# Patient Record
Sex: MALE | Race: WHITE | Employment: FULL TIME | URBAN - METROPOLITAN AREA
[De-identification: names, ages, dates, MRNs, and addresses within clinical notes are randomized per-mention and may not be internally consistent; named-entity substitution may affect disease eponyms.]

---

## 2021-12-10 ENCOUNTER — HOSPITAL ENCOUNTER (EMERGENCY)
Facility: CLINIC | Age: 56
Discharge: HOME | End: 2021-12-10
Attending: EMERGENCY MEDICINE | Admitting: EMERGENCY MEDICINE
Payer: COMMERCIAL

## 2021-12-10 VITALS
SYSTOLIC BLOOD PRESSURE: 133 MMHG | HEART RATE: 86 BPM | DIASTOLIC BLOOD PRESSURE: 96 MMHG | OXYGEN SATURATION: 94 % | TEMPERATURE: 98.5 F | RESPIRATION RATE: 16 BRPM

## 2021-12-10 DIAGNOSIS — R55 SYNCOPE, UNSPECIFIED SYNCOPE TYPE: ICD-10-CM

## 2021-12-10 LAB
ALBUMIN SERPL-MCNC: 3.2 G/DL (ref 3.4–5)
ALP SERPL-CCNC: 68 U/L (ref 40–150)
ALT SERPL W P-5'-P-CCNC: 20 U/L (ref 0–70)
ANION GAP SERPL CALCULATED.3IONS-SCNC: 7 MMOL/L (ref 3–14)
AST SERPL W P-5'-P-CCNC: 22 U/L (ref 0–45)
ATRIAL RATE - MUSE: 78 BPM
BASOPHILS # BLD AUTO: 0 10E3/UL (ref 0–0.2)
BASOPHILS NFR BLD AUTO: 0 %
BILIRUB SERPL-MCNC: 0.3 MG/DL (ref 0.2–1.3)
BUN SERPL-MCNC: 16 MG/DL (ref 7–30)
CALCIUM SERPL-MCNC: 8.7 MG/DL (ref 8.5–10.1)
CHLORIDE BLD-SCNC: 107 MMOL/L (ref 94–109)
CO2 SERPL-SCNC: 24 MMOL/L (ref 20–32)
COHGB MFR BLD: 2.3 % (ref 0–2)
CREAT SERPL-MCNC: 1.43 MG/DL (ref 0.66–1.25)
DIASTOLIC BLOOD PRESSURE - MUSE: NORMAL MMHG
EOSINOPHIL # BLD AUTO: 0 10E3/UL (ref 0–0.7)
EOSINOPHIL NFR BLD AUTO: 0 %
ERYTHROCYTE [DISTWIDTH] IN BLOOD BY AUTOMATED COUNT: 14.5 % (ref 10–15)
FLUAV RNA SPEC QL NAA+PROBE: POSITIVE
FLUBV RNA RESP QL NAA+PROBE: NEGATIVE
GFR SERPL CREATININE-BSD FRML MDRD: 55 ML/MIN/1.73M2
GLUCOSE BLD-MCNC: 97 MG/DL (ref 70–99)
HCT VFR BLD AUTO: 47.8 % (ref 40–53)
HGB BLD-MCNC: 15 G/DL (ref 13.3–17.7)
HOLD SPECIMEN: NORMAL
IMM GRANULOCYTES # BLD: 0.1 10E3/UL
IMM GRANULOCYTES NFR BLD: 1 %
INTERPRETATION ECG - MUSE: NORMAL
LYMPHOCYTES # BLD AUTO: 1.2 10E3/UL (ref 0.8–5.3)
LYMPHOCYTES NFR BLD AUTO: 12 %
MCH RBC QN AUTO: 29.9 PG (ref 26.5–33)
MCHC RBC AUTO-ENTMCNC: 31.4 G/DL (ref 31.5–36.5)
MCV RBC AUTO: 95 FL (ref 78–100)
MONOCYTES # BLD AUTO: 0.9 10E3/UL (ref 0–1.3)
MONOCYTES NFR BLD AUTO: 9 %
NEUTROPHILS # BLD AUTO: 7.8 10E3/UL (ref 1.6–8.3)
NEUTROPHILS NFR BLD AUTO: 78 %
NRBC # BLD AUTO: 0 10E3/UL
NRBC BLD AUTO-RTO: 0 /100
P AXIS - MUSE: 54 DEGREES
PLATELET # BLD AUTO: 301 10E3/UL (ref 150–450)
POTASSIUM BLD-SCNC: 4.4 MMOL/L (ref 3.4–5.3)
PR INTERVAL - MUSE: 170 MS
PROT SERPL-MCNC: 7.8 G/DL (ref 6.8–8.8)
QRS DURATION - MUSE: 90 MS
QT - MUSE: 370 MS
QTC - MUSE: 421 MS
R AXIS - MUSE: 24 DEGREES
RBC # BLD AUTO: 5.02 10E6/UL (ref 4.4–5.9)
SARS-COV-2 RNA RESP QL NAA+PROBE: NEGATIVE
SODIUM SERPL-SCNC: 138 MMOL/L (ref 133–144)
SYSTOLIC BLOOD PRESSURE - MUSE: NORMAL MMHG
T AXIS - MUSE: 44 DEGREES
TROPONIN I SERPL HS-MCNC: 7 NG/L
VENTRICULAR RATE- MUSE: 78 BPM
WBC # BLD AUTO: 10 10E3/UL (ref 4–11)

## 2021-12-10 PROCEDURE — 99284 EMERGENCY DEPT VISIT MOD MDM: CPT | Mod: 25

## 2021-12-10 PROCEDURE — 87636 SARSCOV2 & INF A&B AMP PRB: CPT | Performed by: EMERGENCY MEDICINE

## 2021-12-10 PROCEDURE — 258N000003 HC RX IP 258 OP 636: Performed by: EMERGENCY MEDICINE

## 2021-12-10 PROCEDURE — 82040 ASSAY OF SERUM ALBUMIN: CPT | Performed by: EMERGENCY MEDICINE

## 2021-12-10 PROCEDURE — 93005 ELECTROCARDIOGRAM TRACING: CPT

## 2021-12-10 PROCEDURE — 96361 HYDRATE IV INFUSION ADD-ON: CPT

## 2021-12-10 PROCEDURE — 36415 COLL VENOUS BLD VENIPUNCTURE: CPT | Performed by: EMERGENCY MEDICINE

## 2021-12-10 PROCEDURE — 96360 HYDRATION IV INFUSION INIT: CPT

## 2021-12-10 PROCEDURE — 85025 COMPLETE CBC W/AUTO DIFF WBC: CPT | Performed by: EMERGENCY MEDICINE

## 2021-12-10 PROCEDURE — 84484 ASSAY OF TROPONIN QUANT: CPT | Performed by: EMERGENCY MEDICINE

## 2021-12-10 PROCEDURE — C9803 HOPD COVID-19 SPEC COLLECT: HCPCS

## 2021-12-10 PROCEDURE — 82375 ASSAY CARBOXYHB QUANT: CPT | Performed by: EMERGENCY MEDICINE

## 2021-12-10 RX ADMIN — SODIUM CHLORIDE 1000 ML: 9 INJECTION, SOLUTION INTRAVENOUS at 10:49

## 2021-12-10 ASSESSMENT — ENCOUNTER SYMPTOMS
DYSURIA: 0
ABDOMINAL PAIN: 0
DIARRHEA: 1
FEVER: 1
COUGH: 1
MYALGIAS: 0
DIAPHORESIS: 1

## 2021-12-10 NOTE — ED PROVIDER NOTES
History   Chief Complaint:  Fever     The history is provided by the patient.      Kj Nelson is a 56 year old male with history of renal insufficiency, EMILIE, and ulcerative colitis who presents via ambulance with a fever. Starting 4 days ago the patient began experiencing a dry cough and fever so was seen in the Urgent Care on 12/08. At this time he tested negative for covid and had a chest x-ray performed. He was then discharged with tessalon and robitussin which he has been taking since his appointment. Over the last 2 days he has continued to have an intermittent fever which has been resolved with Tylenol. This morning his fever returned again and he took 2 Tylenol, then 30 minutes later he took his cough medication. Shortly after taking the medications he began to feel diaphoretic and had diarrhea. The patient then had an episode of syncope witnessed by his wife. Upon arrival to the ED he denies any chest pain, abdominal pain, leg pain, leg swelling, or dysuria. The patient is not covid vaccinated and has no known sick exposures.     12/08-  Work Up    Imaging:  XR Chest 2 Views PA and Lateral:  No consolidation. Increased interstitial lung markings. Small calcified granuloma in the right lower lobe. Calcified right hilar and right low paratracheal lymph nodes. No large effusions or pneumothorax. Normal cardiac size.  Per radiology    Review of Systems   Constitutional: Positive for diaphoresis and fever.   Respiratory: Positive for cough.    Cardiovascular: Negative for chest pain and leg swelling.   Gastrointestinal: Positive for diarrhea. Negative for abdominal pain.   Genitourinary: Negative for dysuria.   Musculoskeletal: Negative for myalgias (leg).   Neurological: Positive for syncope.   All other systems reviewed and are negative.      Allergies:  Sulfabenzamide    Medications:  citalopram   levothyroxine   benzonatate   codeine-guaifenesin     Past Medical History:     Renal  insufficiency  Vitamin D deficiency  EMILIE (generalized anxiety disorder)  Ulcerative colitis    Past Surgical History:    Colonoscopy diagnostic     Family History:    Breast cancer     Social History:  Presents unaccompanied.   PCP: No primary care provider on file.     Physical Exam     Patient Vitals for the past 24 hrs:   BP Temp Temp src Pulse Resp SpO2   12/10/21 1145 (!) 135/97 -- -- 79 -- 95 %   12/10/21 1140 (!) 135/95 -- -- 83 -- 94 %   12/10/21 1024 -- 98.5  F (36.9  C) Oral -- -- --   12/10/21 1020 127/88 -- -- 96 16 100 %       Physical Exam  Constitutional: Well appearing.  HEENT: Atraumatic.  PERRL.  EOMI.  Moist mucous membranes.  Neck: Soft.  Supple.  No JVD.  Cardiac: Regular rate and rhythm.  No murmur or rub.  Respiratory: Clear to auscultation bilaterally.  No respiratory distress.   Abdomen: Soft and nontender.  No rebound or guarding.  Nondistended.  Musculoskeletal: No edema.  Normal range of motion.  Neurologic: Alert and oriented x3.  Normal tone and bulk. No facial drooping. Normal speech. 5/5 strength in bilateral upper and lower extremities.    Skin: No rashes.  No edema.  Psych: Normal affect.  Normal behavior.    Emergency Department Course   ECG  ECG obtained at 1100, ECG read at 1106  Normal sinus rhythm. Normal ECG.   No significant change as compared to prior, dated 08/17/2007.  Rate 78 bpm. NC interval 170 ms. QRS duration 90 ms. QT/QTc 370/421 ms. P-R-T axes 54 24 44.     Laboratory:  Labs Ordered and Resulted from Time of ED Arrival to Time of ED Departure   COMPREHENSIVE METABOLIC PANEL - Abnormal       Result Value    Sodium 138      Potassium 4.4      Chloride 107      Carbon Dioxide (CO2) 24      Anion Gap 7      Urea Nitrogen 16      Creatinine 1.43 (*)     Calcium 8.7      Glucose 97      Alkaline Phosphatase 68      AST 22      ALT 20      Protein Total 7.8      Albumin 3.2 (*)     Bilirubin Total 0.3      GFR Estimate 55 (*)    CBC WITH PLATELETS AND DIFFERENTIAL -  Abnormal    WBC Count 10.0      RBC Count 5.02      Hemoglobin 15.0      Hematocrit 47.8      MCV 95      MCH 29.9      MCHC 31.4 (*)     RDW 14.5      Platelet Count 301      % Neutrophils 78      % Lymphocytes 12      % Monocytes 9      % Eosinophils 0      % Basophils 0      % Immature Granulocytes 1      NRBCs per 100 WBC 0      Absolute Neutrophils 7.8      Absolute Lymphocytes 1.2      Absolute Monocytes 0.9      Absolute Eosinophils 0.0      Absolute Basophils 0.0      Absolute Immature Granulocytes 0.1      Absolute NRBCs 0.0     TROPONIN I - Normal    Troponin I High Sensitivity 7     CARBON MONOXIDE   INFLUENZA A/B & SARS-COV2 PCR MULTIPLEX       Emergency Department Course:    Reviewed:  I reviewed nursing notes, vitals, past medical history and Care Everywhere    Assessments:  1018 I obtained history and examined the patient as noted above.    I rechecked the patient and explained findings.     Interventions:  1049 0.9% sodium chloride bolus, 1,000 mL, IV     Disposition:  The patient was discharged to home.     Impression & Plan     Medical Decision Making:  Data name is a 56-year-old man who is afebrile and hemodynamically stable. He is neurologically intact. EKG demonstrates a sinus rhythm with no acute ischemic changes on my read. His lab work-up is noted as above and grossly reassuring. He has been having a fever and some GI symptoms and a cough for a few days. A Covid test was negative from the other day. His cough actually improved and is no longer ongoing. He is not have a fever here. He has no leukocytosis. He appears well and has normal vital signs at this time. We discussed potential vasovagal syncope. His wife is present and states that he has a significant moderate anxiety and the patient himself believes that he likely had a panic attack leading to a syncopal episodes. At this time, I see no high risk factors that would necessitate admission to the hospital for syncopal episode. He is wife  had similar episodes of the same time, however, his wife is also a patient that I was taking care of I think was likely vasovagal in nature due to general GI illness and potential witnessing what Kj was going through. They both deny any drug use and never not use any substances containing organophosphates or any other substance that would demonstrate a toxidrome. His carbon monoxide level is 2.3 just minimally elevated. Fire department was at the house and did not find any evidence of carbon monoxide in the patient's wife had a normal carbon monoxide level. He feels much better and feels countable discharging home. Discussed supportive care at home and the need to follow-up closely with a primary care physician. He is in agreement with this plan and his questions were answered. He was in no distress at time of discharge.      Diagnosis:    ICD-10-CM    1. Syncope, unspecified syncope type  R55        Discharge Medications:  New Prescriptions    No medications on file       Scribe Disclosure:  I, Samantha Diggs, am serving as a scribe at 10:09 AM on 12/10/2021 to document services personally performed by Reynold May MD based on my observations and the provider's statements to me.            Reynold May MD  12/10/21 1510       Reynold May MD  12/10/21 1517

## 2021-12-10 NOTE — ED TRIAGE NOTES
Pt presents to ED with fever for a few days. Pt had diarrhea today. PT and his wife were both feeling sick so they called EMS. Pt appeared pale for EMS upon arival. EMS states that pt pulse was in the 30's when they arrived on scene and improved in to the 70's. Pt had a negative covid test yesterday. ABC intact. Pt alert, acting appropriately upon arrival.

## 2025-07-20 ENCOUNTER — APPOINTMENT (OUTPATIENT)
Dept: CT IMAGING | Facility: CLINIC | Age: 60
End: 2025-07-20
Attending: EMERGENCY MEDICINE
Payer: COMMERCIAL

## 2025-07-20 ENCOUNTER — HOSPITAL ENCOUNTER (INPATIENT)
Facility: CLINIC | Age: 60
LOS: 2 days | Discharge: HOME | End: 2025-07-22
Attending: EMERGENCY MEDICINE | Admitting: INTERNAL MEDICINE
Payer: COMMERCIAL

## 2025-07-20 DIAGNOSIS — L08.9 NECK INFECTION: Primary | ICD-10-CM

## 2025-07-20 LAB
ALBUMIN UR-MCNC: NEGATIVE MG/DL
ANION GAP SERPL CALCULATED.3IONS-SCNC: 14 MMOL/L (ref 7–15)
ANION GAP SERPL CALCULATED.3IONS-SCNC: 14 MMOL/L (ref 7–15)
APPEARANCE UR: CLEAR
BASOPHILS # BLD AUTO: 0 10E3/UL (ref 0–0.2)
BASOPHILS NFR BLD AUTO: 0 %
BILIRUB UR QL STRIP: NEGATIVE
BUN SERPL-MCNC: 16.3 MG/DL (ref 6–20)
BUN SERPL-MCNC: 18.6 MG/DL (ref 6–20)
CALCIUM SERPL-MCNC: 8.8 MG/DL (ref 8.8–10.4)
CALCIUM SERPL-MCNC: 9 MG/DL (ref 8.8–10.4)
CHLORIDE SERPL-SCNC: 100 MMOL/L (ref 98–107)
CHLORIDE SERPL-SCNC: 103 MMOL/L (ref 98–107)
COLOR UR AUTO: ABNORMAL
CREAT SERPL-MCNC: 1.68 MG/DL (ref 0.67–1.17)
CREAT SERPL-MCNC: 1.85 MG/DL (ref 0.67–1.17)
CREAT UR-MCNC: 68.3 MG/DL
CRP SERPL-MCNC: 83.32 MG/L
EGFRCR SERPLBLD CKD-EPI 2021: 42 ML/MIN/1.73M2
EGFRCR SERPLBLD CKD-EPI 2021: 47 ML/MIN/1.73M2
EOSINOPHIL # BLD AUTO: 0.6 10E3/UL (ref 0–0.7)
EOSINOPHIL NFR BLD AUTO: 4 %
ERYTHROCYTE [DISTWIDTH] IN BLOOD BY AUTOMATED COUNT: 14.2 % (ref 10–15)
ERYTHROCYTE [DISTWIDTH] IN BLOOD BY AUTOMATED COUNT: 14.3 % (ref 10–15)
ERYTHROCYTE [SEDIMENTATION RATE] IN BLOOD BY WESTERGREN METHOD: 1 MM/HR (ref 0–20)
FLUAV RNA SPEC QL NAA+PROBE: NEGATIVE
FLUBV RNA RESP QL NAA+PROBE: NEGATIVE
FRACT EXCRET NA UR+SERPL-RTO: 0.9 %
GLUCOSE SERPL-MCNC: 109 MG/DL (ref 70–99)
GLUCOSE SERPL-MCNC: 119 MG/DL (ref 70–99)
GLUCOSE UR STRIP-MCNC: NEGATIVE MG/DL
HCO3 BLDV-SCNC: 24 MMOL/L (ref 21–28)
HCO3 SERPL-SCNC: 20 MMOL/L (ref 22–29)
HCO3 SERPL-SCNC: 21 MMOL/L (ref 22–29)
HCT VFR BLD AUTO: 45.8 % (ref 40–53)
HCT VFR BLD AUTO: 48 % (ref 40–53)
HGB BLD-MCNC: 15.4 G/DL (ref 13.3–17.7)
HGB BLD-MCNC: 16.1 G/DL (ref 13.3–17.7)
HGB UR QL STRIP: ABNORMAL
IMM GRANULOCYTES # BLD: 0.1 10E3/UL
IMM GRANULOCYTES NFR BLD: 1 %
INR PPP: 1.04 (ref 0.85–1.15)
KETONES UR STRIP-MCNC: 10 MG/DL
LACTATE BLD-SCNC: 0.9 MMOL/L (ref 0.7–2)
LEUKOCYTE ESTERASE UR QL STRIP: NEGATIVE
LYMPHOCYTES # BLD AUTO: 2.3 10E3/UL (ref 0.8–5.3)
LYMPHOCYTES NFR BLD AUTO: 19 %
MCH RBC QN AUTO: 30.3 PG (ref 26.5–33)
MCH RBC QN AUTO: 30.3 PG (ref 26.5–33)
MCHC RBC AUTO-ENTMCNC: 33.5 G/DL (ref 31.5–36.5)
MCHC RBC AUTO-ENTMCNC: 33.6 G/DL (ref 31.5–36.5)
MCV RBC AUTO: 90 FL (ref 78–100)
MCV RBC AUTO: 90 FL (ref 78–100)
MONOCYTES # BLD AUTO: 1.7 10E3/UL (ref 0–1.3)
MONOCYTES NFR BLD AUTO: 14 %
MONOCYTES NFR BLD AUTO: NEGATIVE %
MRSA DNA SPEC QL NAA+PROBE: NEGATIVE
MUCOUS THREADS #/AREA URNS LPF: PRESENT /LPF
NEUTROPHILS # BLD AUTO: 7.7 10E3/UL (ref 1.6–8.3)
NEUTROPHILS NFR BLD AUTO: 62 %
NITRATE UR QL: NEGATIVE
NRBC # BLD AUTO: 0 10E3/UL
NRBC BLD AUTO-RTO: 0 /100
PCO2 BLDV: 39 MM HG (ref 40–50)
PH BLDV: 7.4 [PH] (ref 7.32–7.43)
PH UR STRIP: 5 [PH] (ref 5–7)
PLATELET # BLD AUTO: 364 10E3/UL (ref 150–450)
PLATELET # BLD AUTO: 370 10E3/UL (ref 150–450)
PO2 BLDV: 22 MM HG (ref 25–47)
POTASSIUM SERPL-SCNC: 4.1 MMOL/L (ref 3.4–5.3)
POTASSIUM SERPL-SCNC: 4.3 MMOL/L (ref 3.4–5.3)
PROTHROMBIN TIME: 13.7 SECONDS (ref 11.8–14.8)
RBC # BLD AUTO: 5.09 10E6/UL (ref 4.4–5.9)
RBC # BLD AUTO: 5.32 10E6/UL (ref 4.4–5.9)
RBC URINE: 1 /HPF
RSV RNA SPEC NAA+PROBE: NEGATIVE
S PYO DNA THROAT QL NAA+PROBE: NOT DETECTED
SA TARGET DNA: NEGATIVE
SAO2 % BLDV: 36 % (ref 70–75)
SARS-COV-2 RNA RESP QL NAA+PROBE: NEGATIVE
SODIUM SERPL-SCNC: 135 MMOL/L (ref 135–145)
SODIUM SERPL-SCNC: 137 MMOL/L (ref 135–145)
SODIUM UR-SCNC: 50 MMOL/L
SP GR UR STRIP: 1.03 (ref 1–1.03)
UROBILINOGEN UR STRIP-MCNC: NORMAL MG/DL
WBC # BLD AUTO: 11.7 10E3/UL (ref 4–11)
WBC # BLD AUTO: 12.4 10E3/UL (ref 4–11)
WBC URINE: 1 /HPF

## 2025-07-20 PROCEDURE — 82374 ASSAY BLOOD CARBON DIOXIDE: CPT | Performed by: INTERNAL MEDICINE

## 2025-07-20 PROCEDURE — 87637 SARSCOV2&INF A&B&RSV AMP PRB: CPT | Performed by: EMERGENCY MEDICINE

## 2025-07-20 PROCEDURE — 250N000011 HC RX IP 250 OP 636: Performed by: EMERGENCY MEDICINE

## 2025-07-20 PROCEDURE — 99207 PR APP CREDIT; MD BILLING SHARED VISIT: CPT | Performed by: INTERNAL MEDICINE

## 2025-07-20 PROCEDURE — 96365 THER/PROPH/DIAG IV INF INIT: CPT | Mod: 59

## 2025-07-20 PROCEDURE — 250N000009 HC RX 250: Performed by: EMERGENCY MEDICINE

## 2025-07-20 PROCEDURE — 96366 THER/PROPH/DIAG IV INF ADDON: CPT

## 2025-07-20 PROCEDURE — 36415 COLL VENOUS BLD VENIPUNCTURE: CPT | Performed by: INTERNAL MEDICINE

## 2025-07-20 PROCEDURE — 99285 EMERGENCY DEPT VISIT HI MDM: CPT | Mod: 25 | Performed by: EMERGENCY MEDICINE

## 2025-07-20 PROCEDURE — 85018 HEMOGLOBIN: CPT | Performed by: INTERNAL MEDICINE

## 2025-07-20 PROCEDURE — 86140 C-REACTIVE PROTEIN: CPT | Performed by: EMERGENCY MEDICINE

## 2025-07-20 PROCEDURE — 70491 CT SOFT TISSUE NECK W/DYE: CPT

## 2025-07-20 PROCEDURE — 99223 1ST HOSP IP/OBS HIGH 75: CPT | Mod: AI | Performed by: INTERNAL MEDICINE

## 2025-07-20 PROCEDURE — 258N000003 HC RX IP 258 OP 636: Performed by: EMERGENCY MEDICINE

## 2025-07-20 PROCEDURE — 87040 BLOOD CULTURE FOR BACTERIA: CPT | Performed by: EMERGENCY MEDICINE

## 2025-07-20 PROCEDURE — 85025 COMPLETE CBC W/AUTO DIFF WBC: CPT | Performed by: EMERGENCY MEDICINE

## 2025-07-20 PROCEDURE — 87651 STREP A DNA AMP PROBE: CPT | Performed by: EMERGENCY MEDICINE

## 2025-07-20 PROCEDURE — 250N000011 HC RX IP 250 OP 636: Performed by: INTERNAL MEDICINE

## 2025-07-20 PROCEDURE — 36415 COLL VENOUS BLD VENIPUNCTURE: CPT | Performed by: EMERGENCY MEDICINE

## 2025-07-20 PROCEDURE — 81003 URINALYSIS AUTO W/O SCOPE: CPT | Performed by: INTERNAL MEDICINE

## 2025-07-20 PROCEDURE — 258N000003 HC RX IP 258 OP 636: Performed by: INTERNAL MEDICINE

## 2025-07-20 PROCEDURE — 82803 BLOOD GASES ANY COMBINATION: CPT

## 2025-07-20 PROCEDURE — 82570 ASSAY OF URINE CREATININE: CPT | Performed by: INTERNAL MEDICINE

## 2025-07-20 PROCEDURE — 86308 HETEROPHILE ANTIBODY SCREEN: CPT | Performed by: EMERGENCY MEDICINE

## 2025-07-20 PROCEDURE — 99418 PROLNG IP/OBS E/M EA 15 MIN: CPT | Performed by: INTERNAL MEDICINE

## 2025-07-20 PROCEDURE — 85652 RBC SED RATE AUTOMATED: CPT | Performed by: EMERGENCY MEDICINE

## 2025-07-20 PROCEDURE — 80048 BASIC METABOLIC PNL TOTAL CA: CPT | Performed by: EMERGENCY MEDICINE

## 2025-07-20 PROCEDURE — 85610 PROTHROMBIN TIME: CPT | Performed by: INTERNAL MEDICINE

## 2025-07-20 PROCEDURE — 87641 MR-STAPH DNA AMP PROBE: CPT | Performed by: INTERNAL MEDICINE

## 2025-07-20 PROCEDURE — 120N000001 HC R&B MED SURG/OB

## 2025-07-20 PROCEDURE — 250N000013 HC RX MED GY IP 250 OP 250 PS 637: Performed by: EMERGENCY MEDICINE

## 2025-07-20 RX ORDER — IOPAMIDOL 755 MG/ML
500 INJECTION, SOLUTION INTRAVASCULAR ONCE
Status: COMPLETED | OUTPATIENT
Start: 2025-07-20 | End: 2025-07-20

## 2025-07-20 RX ORDER — ENOXAPARIN SODIUM 100 MG/ML
40 INJECTION SUBCUTANEOUS EVERY 24 HOURS
Status: DISCONTINUED | OUTPATIENT
Start: 2025-07-20 | End: 2025-07-22 | Stop reason: HOSPADM

## 2025-07-20 RX ORDER — CYCLOBENZAPRINE HCL 10 MG
5 TABLET ORAL 3 TIMES DAILY PRN
COMMUNITY

## 2025-07-20 RX ORDER — ACETAMINOPHEN 325 MG/1
975 TABLET ORAL ONCE
Status: COMPLETED | OUTPATIENT
Start: 2025-07-20 | End: 2025-07-20

## 2025-07-20 RX ORDER — LEVOTHYROXINE SODIUM 75 UG/1
75 TABLET ORAL DAILY
COMMUNITY

## 2025-07-20 RX ORDER — ONDANSETRON 2 MG/ML
4 INJECTION INTRAMUSCULAR; INTRAVENOUS EVERY 6 HOURS PRN
Status: DISCONTINUED | OUTPATIENT
Start: 2025-07-20 | End: 2025-07-22 | Stop reason: HOSPADM

## 2025-07-20 RX ORDER — AMPICILLIN AND SULBACTAM 2; 1 G/1; G/1
3 INJECTION, POWDER, FOR SOLUTION INTRAMUSCULAR; INTRAVENOUS ONCE
Status: COMPLETED | OUTPATIENT
Start: 2025-07-20 | End: 2025-07-20

## 2025-07-20 RX ORDER — LORAZEPAM 0.5 MG/1
0.5 TABLET ORAL DAILY PRN
COMMUNITY

## 2025-07-20 RX ORDER — AMPICILLIN AND SULBACTAM 2; 1 G/1; G/1
3 INJECTION, POWDER, FOR SOLUTION INTRAMUSCULAR; INTRAVENOUS EVERY 6 HOURS
Status: DISCONTINUED | OUTPATIENT
Start: 2025-07-20 | End: 2025-07-22 | Stop reason: HOSPADM

## 2025-07-20 RX ORDER — LIDOCAINE 40 MG/G
CREAM TOPICAL
Status: DISCONTINUED | OUTPATIENT
Start: 2025-07-20 | End: 2025-07-22 | Stop reason: HOSPADM

## 2025-07-20 RX ORDER — ACETAMINOPHEN 325 MG/10.15ML
650 LIQUID ORAL EVERY 4 HOURS PRN
Status: DISCONTINUED | OUTPATIENT
Start: 2025-07-20 | End: 2025-07-22 | Stop reason: HOSPADM

## 2025-07-20 RX ORDER — PROCHLORPERAZINE MALEATE 5 MG/1
10 TABLET ORAL EVERY 6 HOURS PRN
Status: DISCONTINUED | OUTPATIENT
Start: 2025-07-20 | End: 2025-07-22 | Stop reason: HOSPADM

## 2025-07-20 RX ORDER — CITALOPRAM HYDROBROMIDE 20 MG/1
20 TABLET ORAL DAILY
COMMUNITY

## 2025-07-20 RX ORDER — ONDANSETRON 4 MG/1
4 TABLET, ORALLY DISINTEGRATING ORAL EVERY 6 HOURS PRN
Status: DISCONTINUED | OUTPATIENT
Start: 2025-07-20 | End: 2025-07-22 | Stop reason: HOSPADM

## 2025-07-20 RX ORDER — CALCIUM CARBONATE 500 MG/1
1000 TABLET, CHEWABLE ORAL 4 TIMES DAILY PRN
Status: DISCONTINUED | OUTPATIENT
Start: 2025-07-20 | End: 2025-07-22 | Stop reason: HOSPADM

## 2025-07-20 RX ORDER — AMOXICILLIN 250 MG
1 CAPSULE ORAL 2 TIMES DAILY PRN
Status: DISCONTINUED | OUTPATIENT
Start: 2025-07-20 | End: 2025-07-22 | Stop reason: HOSPADM

## 2025-07-20 RX ORDER — SODIUM CHLORIDE 9 MG/ML
INJECTION, SOLUTION INTRAVENOUS CONTINUOUS
Status: DISCONTINUED | OUTPATIENT
Start: 2025-07-20 | End: 2025-07-20

## 2025-07-20 RX ORDER — AMOXICILLIN 250 MG
2 CAPSULE ORAL 2 TIMES DAILY PRN
Status: DISCONTINUED | OUTPATIENT
Start: 2025-07-20 | End: 2025-07-22 | Stop reason: HOSPADM

## 2025-07-20 RX ADMIN — AMPICILLIN SODIUM AND SULBACTAM SODIUM 3 G: 2; 1 INJECTION, POWDER, FOR SOLUTION INTRAMUSCULAR; INTRAVENOUS at 03:40

## 2025-07-20 RX ADMIN — ENOXAPARIN SODIUM 40 MG: 40 INJECTION SUBCUTANEOUS at 20:57

## 2025-07-20 RX ADMIN — IOPAMIDOL 90 ML: 755 INJECTION, SOLUTION INTRAVENOUS at 02:12

## 2025-07-20 RX ADMIN — AMPICILLIN SODIUM AND SULBACTAM SODIUM 3 G: 2; 1 INJECTION, POWDER, FOR SOLUTION INTRAMUSCULAR; INTRAVENOUS at 15:29

## 2025-07-20 RX ADMIN — SODIUM CHLORIDE 65 ML: 9 INJECTION, SOLUTION INTRAVENOUS at 02:12

## 2025-07-20 RX ADMIN — AMPICILLIN SODIUM AND SULBACTAM SODIUM 3 G: 2; 1 INJECTION, POWDER, FOR SOLUTION INTRAMUSCULAR; INTRAVENOUS at 09:33

## 2025-07-20 RX ADMIN — ACETAMINOPHEN 975 MG: 325 TABLET ORAL at 01:36

## 2025-07-20 RX ADMIN — SODIUM CHLORIDE 1000 ML: 9 INJECTION, SOLUTION INTRAVENOUS at 01:35

## 2025-07-20 RX ADMIN — AMPICILLIN SODIUM AND SULBACTAM SODIUM 3 G: 2; 1 INJECTION, POWDER, FOR SOLUTION INTRAMUSCULAR; INTRAVENOUS at 21:00

## 2025-07-20 RX ADMIN — SODIUM CHLORIDE: 0.9 INJECTION, SOLUTION INTRAVENOUS at 06:12

## 2025-07-20 ASSESSMENT — ACTIVITIES OF DAILY LIVING (ADL)
WALKING_OR_CLIMBING_STAIRS_DIFFICULTY: NO
ADLS_ACUITY_SCORE: 20
WEAR_GLASSES_OR_BLIND: NO
DOING_ERRANDS_INDEPENDENTLY_DIFFICULTY: NO
ADLS_ACUITY_SCORE: 20
ADLS_ACUITY_SCORE: 20
ADLS_ACUITY_SCORE: 22
ADLS_ACUITY_SCORE: 20
ADLS_ACUITY_SCORE: 20
ADLS_ACUITY_SCORE: 22
ADLS_ACUITY_SCORE: 20
ADLS_ACUITY_SCORE: 20
DIFFICULTY_EATING/SWALLOWING: NO
TOILETING_ISSUES: NO
ADLS_ACUITY_SCORE: 22
DIFFICULTY_COMMUNICATING: NO
ADLS_ACUITY_SCORE: 41
ADLS_ACUITY_SCORE: 22
ADLS_ACUITY_SCORE: 20
CONCENTRATING,_REMEMBERING_OR_MAKING_DECISIONS_DIFFICULTY: NO
ADLS_ACUITY_SCORE: 20
ADLS_ACUITY_SCORE: 22
ADLS_ACUITY_SCORE: 22
DRESSING/BATHING_DIFFICULTY: NO
ADLS_ACUITY_SCORE: 41
ADLS_ACUITY_SCORE: 41
ADLS_ACUITY_SCORE: 20
ADLS_ACUITY_SCORE: 41
FALL_HISTORY_WITHIN_LAST_SIX_MONTHS: NO
HEARING_DIFFICULTY_OR_DEAF: NO
ADLS_ACUITY_SCORE: 20
ADLS_ACUITY_SCORE: 41
ADLS_ACUITY_SCORE: 22
CHANGE_IN_FUNCTIONAL_STATUS_SINCE_ONSET_OF_CURRENT_ILLNESS/INJURY: NO

## 2025-07-20 ASSESSMENT — COLUMBIA-SUICIDE SEVERITY RATING SCALE - C-SSRS
2. HAVE YOU ACTUALLY HAD ANY THOUGHTS OF KILLING YOURSELF IN THE PAST MONTH?: NO
6. HAVE YOU EVER DONE ANYTHING, STARTED TO DO ANYTHING, OR PREPARED TO DO ANYTHING TO END YOUR LIFE?: NO
1. IN THE PAST MONTH, HAVE YOU WISHED YOU WERE DEAD OR WISHED YOU COULD GO TO SLEEP AND NOT WAKE UP?: NO

## 2025-07-20 NOTE — PLAN OF CARE
"Goal Outcome Evaluation:      Plan of Care Reviewed With: patient    Overall Patient Progress: improvingOverall Patient Progress: improving    Outcome Evaluation: A&O. VSS on RA. NS @ 100mL/hr. Independent in room, wife and family at bedside. Regular diet. Pt on IV unasyn. Voiding well, walking to bathroom. Denied nausea. Denied pain. ENT consulted, cleared from their perspective. Very anxious. Discharge plan TBD, potentially tomorrow.     Problem: Adult Inpatient Plan of Care  Goal: Plan of Care Review  Description: The Plan of Care Review/Shift note should be completed every shift.  The Outcome Evaluation is a brief statement about your assessment that the patient is improving, declining, or no change.  This information will be displayed automatically on your shift  note.  Outcome: Progressing  Flowsheets (Taken 7/20/2025 0954)  Outcome Evaluation: A&O. VSS on RA. NS @ 100mL/hr. Independent in room, wife and family at bedside. NPO maintained. Pt on IV unasyn. Voiding well, walking to bathroom. Denied nausea. Denied pain. ENT consuted. Discharge plan TBD.  Plan of Care Reviewed With: patient  Overall Patient Progress: improving  Goal: Patient-Specific Goal (Individualized)  Description: You can add care plan individualizations to a care plan. Examples of Individualization might be:  \"Parent requests to be called daily at 9am for status\", \"I have a hard time hearing out of my right ear\", or \"Do not touch me to wake me up as it startles  me\".  Outcome: Progressing  Goal: Absence of Hospital-Acquired Illness or Injury  Outcome: Progressing  Intervention: Identify and Manage Fall Risk  Recent Flowsheet Documentation  Taken 7/20/2025 0908 by Huma Salazar RN  Safety Promotion/Fall Prevention:   clutter free environment maintained   lighting adjusted   mobility aid in reach   nonskid shoes/slippers when out of bed   patient and family education   room near nurse's station   room organization consistent   safety " round/check completed  Intervention: Prevent and Manage VTE (Venous Thromboembolism) Risk  Recent Flowsheet Documentation  Taken 7/20/2025 0936 by Huma Salazar RN  VTE Prevention/Management: SCDs on (sequential compression devices)  Intervention: Prevent Infection  Recent Flowsheet Documentation  Taken 7/20/2025 0936 by Huma Salazar RN  Infection Prevention:   cohorting utilized   equipment surfaces disinfected   hand hygiene promoted   rest/sleep promoted   single patient room provided  Goal: Optimal Comfort and Wellbeing  Outcome: Progressing  Goal: Readiness for Transition of Care  Outcome: Progressing     Problem: Infection  Goal: Absence of Infection Signs and Symptoms  Outcome: Progressing  Intervention: Prevent or Manage Infection  Recent Flowsheet Documentation  Taken 7/20/2025 0936 by Huma Salazar RN  Isolation Precautions: special precautions discontinued     Problem: Pain Acute  Goal: Optimal Pain Control and Function  Outcome: Progressing  Intervention: Prevent or Manage Pain  Recent Flowsheet Documentation  Taken 7/20/2025 0936 by Huma Salazar RN  Medication Review/Management: medications reviewed

## 2025-07-20 NOTE — PLAN OF CARE
"Goal Outcome Evaluation:      Plan of Care Reviewed With: patient, spouse    Overall Patient Progress: improvingOverall Patient Progress: improving     A&Ox4. Wife at bedside most of the evening. VSS on room air. Denies pain this shift. Swelling to neck improving. Denies nausea or SOB. IV SL.  IV unasyn for abx. Tolerating regular diet. Up independently in room. Showered. Voiding well. Discharge plan is TBD.    Problem: Adult Inpatient Plan of Care  Goal: Plan of Care Review  Description: The Plan of Care Review/Shift note should be completed every shift.  The Outcome Evaluation is a brief statement about your assessment that the patient is improving, declining, or no change.  This information will be displayed automatically on your shift  note.  Outcome: Progressing  Flowsheets (Taken 7/20/2025 1856)  Plan of Care Reviewed With:   patient   spouse  Overall Patient Progress: improving  Goal: Patient-Specific Goal (Individualized)  Description: You can add care plan individualizations to a care plan. Examples of Individualization might be:  \"Parent requests to be called daily at 9am for status\", \"I have a hard time hearing out of my right ear\", or \"Do not touch me to wake me up as it startles  me\".  Outcome: Progressing  Goal: Absence of Hospital-Acquired Illness or Injury  Outcome: Progressing  Intervention: Identify and Manage Fall Risk  Recent Flowsheet Documentation  Taken 7/20/2025 1533 by Rufina Brewer RN  Safety Promotion/Fall Prevention: safety round/check completed  Intervention: Prevent Skin Injury  Recent Flowsheet Documentation  Taken 7/20/2025 1533 by Rufina Brewer RN  Body Position: position changed independently  Skin Protection: adhesive use limited  Intervention: Prevent and Manage VTE (Venous Thromboembolism) Risk  Recent Flowsheet Documentation  Taken 7/20/2025 1533 by Rufina Brewer RN  VTE Prevention/Management: (ambulating in room) SCDs off (sequential compression devices)  Intervention: " Prevent Infection  Recent Flowsheet Documentation  Taken 7/20/2025 1533 by Rufina Brewer, RN  Infection Prevention:   hand hygiene promoted   rest/sleep promoted   single patient room provided  Goal: Optimal Comfort and Wellbeing  Outcome: Progressing  Goal: Readiness for Transition of Care  Outcome: Progressing     Problem: Infection  Goal: Absence of Infection Signs and Symptoms  Outcome: Progressing     Problem: Pain Acute  Goal: Optimal Pain Control and Function  Outcome: Progressing  Intervention: Prevent or Manage Pain  Recent Flowsheet Documentation  Taken 7/20/2025 1533 by Rufina Brewer, RN  Bowel Elimination Promotion:   adequate fluid intake promoted   ambulation promoted  Medication Review/Management: medications reviewed

## 2025-07-20 NOTE — ED TRIAGE NOTES
Here with wife for pain in left neck which started on Thursday after turning head sharply. Was seen at  at 1000 Saturday for pain and given toradol and flexeril, states toradol helped pain but has since worn off. Tylenol approx 1 hour PTA, flexeril 2130. States now neck is warm to touch, swollen and running low grade fever. Endorses sore throat which also started Thursday.

## 2025-07-20 NOTE — PROGRESS NOTES
[facility]    Medicine Progress Note - Hospitalist Service    Date of Admission:  7/20/2025    Assessment & Plan      Kj Nelson is a 59 year old male admitted on 7/20/2025. He has PMH notable for anxiety, hypothyroidism that presents with left-sided neck pain and has findings concerning for unilateral deep space infection of the neck.  At the time of admission his findings are unilateral but he is at high risk to progress to Brennan's angina.        #Left-sided deep space neck infection     - Patient still needing inpatient care as he remained at risk for clinical deterioration will be benefited for close monitoring care  -Remain on broad-spectrum antibiotics currently on Unasyn  -Optimize pain control  -N.p.o. until seen by ENT service  -Continue to monitor and inflammatory and infectious markers    #Chronic kidney disease  - Doubtful for any MYLES here  - I reviewed patient's care everywhere charting and has been had  findings of elevated creatinine at least in the last 15 years with no worsening levels.  He has been evaluated by his current providers and even nephrology service and no clear diagnosis regarding his elevated creatinine  - He is voiding freely and appears to be at baseline creatinine level  - I will no longer  pursue further investigation of this elevated creatinine here in the hospital but needs to closely follow-up with his outpatient providers    I will refer you to excerpts of my colleagues prior H&P as listed below for other details of his earlier presentation:    The patient presents with subjective fevers, sore throat, left-sided neck swelling and neck pain/tenderness.  Denies difficulty breathing and difficulty swallowing.  At the time of presentation he is afebrile, hemodynamically stable.  Exam with left-sided neck swelling and tenderness with no crepitus.  WBC 12.4 and CRP 83 on admission.  Lactic acid 0.9.  CT neck soft tissue with abnormal soft tissue  "stranding and edema involving the left palatine tonsil and hypopharynx with extension into the submandibular space and carotid space where there is mild soft tissue stranding and lymphadenopathy. There is some compression of the internal jugular vein on the left without evidence of thrombus.  Overall findings are favored to represent infection. No evidence of abscess.  Unclear what triggered this deep space infection.  The patient denies recent history of tooth pain or odontogenic infection.  His findings are unilateral and therefore he is unlikely to have Brennan's angina.  There is no IJ thrombus/thrombophlebitis suggestive of Lemierre's syndrome  - N.p.o.  - ENT consult  - Continue Unasyn started in the ED  - Trend inflammatory markers  - The patient has no risk factors for MRSA.  Will obtain MRSA swab but hold vancomycin now since he has no risk factors  - Blood cultures pending  - Liquid Tylenol as needed for pain  - Start Lovenox if not undergoing a procedure later today given compression of left IJ        #Anxiety  - Continue citalopram    #Hypothyroidism  - Continue Synthroid          Diet: NPO for Medical/Clinical Reasons Except for: Meds, Ice Chips    DVT Prophylaxis: Pneumatic Compression Devices and Ambulate every shift  - Will benefit for Lovenox subcutaneous DVT prophylaxis if no surgical intervention or intervention being contemplated by ENT  Monteiro Catheter: Not present  Lines: None     Cardiac Monitoring: None  Code Status: Full Code      Clinically Significant Risk Factors Present on Admission                             # Overweight: Estimated body mass index is 29.06 kg/m  as calculated from the following:    Height as of this encounter: 1.778 m (5' 10\").    Weight as of this encounter: 91.9 kg (202 lb 8 oz).              Social Drivers of Health    Tobacco Use: Medium Risk (7/19/2025)    Received from [facility]    Patient History     Smoking Tobacco Use: Former "     Smokeless Tobacco Use: Never          Disposition Plan     Medically Ready for Discharge: Anticipated in 2-4 Days pending resolution of neck swelling             Robert Pacheco MD, MD  Hospitalist Service  [facility]  Securely message with [employer] (more info)  Text page via [employer] Paging/Directory   ______________________________________________________________________    Interval History   Resume medicine service care today.  Seen and examined.  Chart reviewed.  Case discussed with nursing service.  Family updated this patient's  as  patient's wife present at bedside  Fortunately Carlos is not complaining of any worsening shortness of breath.  No drooling.  Denies any severe uncontrolled pain.  No mental status changes.  No reported hypoxia.  Currently afebrile.  Still demonstrating stable hemodynamics.  Denies any vomiting, no diarrhea.  No alteration in mental state    Physical Exam   Vital Signs: Temp: 98.6  F (37  C) Temp src: Temporal BP: (!) 137/94 (pt said he has high anxiety so blood pressure tends to be high but only when being taking by a healthcare professional) Pulse: 95   Resp: 16 SpO2: 96 % O2 Device: None (Room air)    Weight: 202 lbs 8 oz    I will refer you to earlier same-day exam on same-day H&P      Medical Decision Making       50 MINUTES SPENT BY ME on the date of service doing chart review, history, exam, documentation & further activities per the note.  MANAGEMENT DISCUSSED with the following over the past 24 hours: Yes   NOTE(S)/MEDICAL RECORDS REVIEWED over the past 24 hours: Yes      Data     I have personally reviewed the following data over the past 24 hrs:    11.7 (H)  \   15.4   / 370     137 103 16.3 /  109 (H)   4.1 20 (L) 1.68 (H) \     Procal: N/A CRP: 83.32 (H) Lactic Acid: 0.9       INR:  1.04 PTT:  N/A   D-dimer:  N/A Fibrinogen:  N/A       Imaging results reviewed over the past 24 hrs:   Recent Results (from the past 24 hours)   Soft tissue neck CT  w contrast    Narrative    EXAM: CT SOFT TISSUE NECK W CONTRAST  LOCATION: [facility]  DATE: 07/20/2025    INDICATION: Sore throat, left-sided neck pain, fever, voice change.  COMPARISON: None.  CONTRAST: 90 mL Isovue 370.  TECHNIQUE: Routine CT Soft Tissue Neck with IV contrast. Multiplanar reformats. Dose reduction techniques were used.    FINDINGS: The oropharynx and hypopharynx are limited in evaluation due to streak artifact from the patient's dental hardware. There does appear to be abnormal enlargement and soft tissue swelling of the left greater than right palatine tonsils without   definite peritonsillar abscess. Edema extends into the right upper hypopharynx. Additional mild soft tissue stranding in the left submandibular and carotid spaces, presumably related to the same process. There is stranding and mild adenopathy adjacent to   the left internal jugular vein which is flattened, though no evidence of jugular vein thrombus. Edema effaces the left piriform sinus and vallecula. Airway is widely patent.    SALIVARY GLANDS: Normal parotid and submandibular glands.    THYROID: Normal.     VISUALIZED INTRACRANIAL/ORBITS/SINUSES: No abnormality of the visualized intracranial compartment or orbits. Complete opacification of the right maxillary sinus. Paranasal sinuses are otherwise clear.    OTHER: No destructive osseous lesion. The included lung apices are clear.      Impression    IMPRESSION:   1.  Somewhat limited evaluation of the oral cavity, oropharynx, and upper hypopharynx due to dental artifact. There is abnormal soft tissue stranding and edema involving the left palatine tonsil and hypopharynx with extension into the submandibular space   and carotid space where there is mild soft tissue stranding and lymphadenopathy. There is some compression of the internal jugular vein on the left without evidence of thrombus. Findings are favored to represent infection. No evidence of  abscess.    2.  Complete opacification of the right maxillary sinus.

## 2025-07-20 NOTE — PHARMACY-ADMISSION MEDICATION HISTORY
Pharmacy Intern Admission Medication History    Admission medication history is complete. The information provided in this note is only as accurate as the sources available at the time of the update.    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    Changes made to PTA medication list:  Added: All  Deleted: None  Changed: None    Allergies reviewed with patient and updates made in EHR: yes    Medication History Completed By: Kayla Chambers 7/20/2025 9:17 AM    PTA Med List   Medication Sig Last Dose/Taking    citalopram (CELEXA) 20 MG tablet Take 20 mg by mouth daily. 7/19/2025 Evening    cyclobenzaprine (FLEXERIL) 10 MG tablet Take 5 mg by mouth 3 times daily as needed for muscle spasms. 7/19/2025 Evening    levothyroxine (SYNTHROID/LEVOTHROID) 75 MCG tablet Take 75 mcg by mouth daily. 7/19/2025 Evening    LORazepam (ATIVAN) 0.5 MG tablet Take 0.5 mg by mouth daily as needed for anxiety. More than a month

## 2025-07-20 NOTE — ED PROVIDER NOTES
"  Emergency Department Note      History of Present Illness     Chief Complaint   Neck Pain      HPI   Kj Nelson is a 59 year old male with a history of hypertension who presents to the ED for left sided neck pain. The patient reports that on Thursday he felt a pinch sensation localized to the left side of his neck while turning his head driving. He details when he woke up the next morning he began to suffer from pain and slight swelling localized to the same area. Moreover, he also felt feverish reporting a temperature of 99.7 F at one point. He also reports slight pain with movement of the neck as well and came into the UC on Friday.  gave him Toradol which helped alleviate his symptoms however has since worn off. He endorses an odd sensation with swallowing as well as a noticeably \"raspy\" voice when speaking. He denies any headache, congestion, cough, chest pain, shortness of breath, numbness, paresthesia, ear pain, or dental pain. He also denies a history of neck vessel issues or having seen a chiropractor recently.    Independent Historian   None    Review of External Notes   I reviewed a 07/19/2025 Urgent Care visit note.  I reviewed a 04/16/2025 office visit note.    Past Medical History     Medical History and Problem List   Anxiety  Chronic ulcerative colitis  Chronic ulcerative pancolitis  Diverticular disease of large intestine  Hypertension  Furuncle of groin  CKD stage 3  Vitamin D deficiency    Medications   Colazal  Celexa  Flexeril  Synthroid/Levothroid  Ativan    Surgical History   No surgical history on file.    Physical Exam     Patient Vitals for the past 24 hrs:   BP Temp Temp src Pulse Resp SpO2 Height Weight   07/20/25 0044 (!) 161/110 99.5  F (37.5  C) Temporal 119 20 99 % 1.778 m (5' 10\") 91.9 kg (202 lb 9.6 oz)     Physical Exam  Constitutional:       Appearance: Normal appearance.   HENT:      Head: Normocephalic and atraumatic.      Mouth: Posterior oropharyngeal erythema without " significant tonsillar swelling, exudates, or evidence of peritonsillar abscess.  Midline uvula.  No trismus.     Neck: Left-sided neck swelling that is tender to palpation.  Warm to the touch.  No overlying erythema.  No palpable fluctuance.  No extension to the submandibular regions or the face.  Eyes:      Extraocular Movements: Extraocular movements intact.      Conjunctiva/sclera: Conjunctivae normal.   Cardiovascular:      Rate and Rhythm: Normal rate and regular rhythm.   Pulmonary:      Effort: Pulmonary effort is normal. No respiratory distress.      Breath sounds: Clear to auscultation bilaterally.  No wheezing.  No crackles.  No stridor.  Abdominal:      General: Abdomen is flat. There is no distension.      Palpations: Abdomen is soft.      Tenderness: There is no abdominal tenderness.   Musculoskeletal:      Cervical back: Normal range of motion. No rigidity.       Right lower leg: No edema.      Left lower leg: No edema.   Skin:     General: Skin is warm and dry.   Neurological:      General: No focal deficit present.      Mental Status: Alert and oriented to person, place, and time.   Psychiatric:         Mood and Affect: Mood normal.         Behavior: Behavior normal.    Diagnostics     Lab Results   Labs Ordered and Resulted from Time of ED Arrival to Time of ED Departure   BASIC METABOLIC PANEL - Abnormal       Result Value    Sodium 135      Potassium 4.3      Chloride 100      Carbon Dioxide (CO2) 21 (*)     Anion Gap 14      Urea Nitrogen 18.6      Creatinine 1.85 (*)     GFR Estimate 42 (*)     Calcium 9.0      Glucose 119 (*)    CRP INFLAMMATION - Abnormal    CRP Inflammation 83.32 (*)    CBC WITH PLATELETS AND DIFFERENTIAL - Abnormal    WBC Count 12.4 (*)     RBC Count 5.32      Hemoglobin 16.1      Hematocrit 48.0      MCV 90      MCH 30.3      MCHC 33.5      RDW 14.3      Platelet Count 364      % Neutrophils 62      % Lymphocytes 19      % Monocytes 14      % Eosinophils 4      % Basophils  0      % Immature Granulocytes 1      NRBCs per 100 WBC 0      Absolute Neutrophils 7.7      Absolute Lymphocytes 2.3      Absolute Monocytes 1.7 (*)     Absolute Eosinophils 0.6      Absolute Basophils 0.0      Absolute Immature Granulocytes 0.1      Absolute NRBCs 0.0     ISTAT GASES LACTATE VENOUS POCT - Abnormal    Lactic Acid POCT 0.9      Bicarbonate Venous POCT 24      O2 Sat, Venous POCT 36 (*)     pCO2 Venous POCT 39 (*)     pH Venous POCT 7.40      pO2 Venous POCT 22 (*)    INFLUENZA A/B, RSV AND SARS-COV2 PCR - Normal    Influenza A PCR Negative      Influenza B PCR Negative      RSV PCR Negative      SARS CoV2 PCR Negative     ERYTHROCYTE SEDIMENTATION RATE AUTO - Normal    Erythrocyte Sedimentation Rate 1     MONONUCLEOSIS SCREEN - Normal    Mononucleosis Screen Negative     GROUP A STREPTOCOCCUS PCR THROAT SWAB - Normal    Group A strep by PCR Not Detected     BLOOD CULTURE   BLOOD CULTURE   MRSA MSSA PCR, NASAL SWAB       Imaging   Soft tissue neck CT w contrast   Final Result   IMPRESSION:    1.  Somewhat limited evaluation of the oral cavity, oropharynx, and upper hypopharynx due to dental artifact. There is abnormal soft tissue stranding and edema involving the left palatine tonsil and hypopharynx with extension into the submandibular space    and carotid space where there is mild soft tissue stranding and lymphadenopathy. There is some compression of the internal jugular vein on the left without evidence of thrombus. Findings are favored to represent infection. No evidence of abscess.      2.  Complete opacification of the right maxillary sinus.            Independent Interpretation   None    ED Course      Medications Administered   Medications   sodium chloride 0.9% BOLUS 1,000 mL (0 mLs Intravenous Stopped 7/20/25 0340)   acetaminophen (TYLENOL) tablet 975 mg (975 mg Oral $Given 7/20/25 0136)   iopamidol (ISOVUE-370) solution 500 mL (90 mLs Intravenous $Given 7/20/25 0212)   sodium chloride 0.9  % bag for CT scan flush (65 mLs Intravenous $Given 7/20/25 0212)   ampicillin-sulbactam (UNASYN) 3 g vial to attach to  mL bag (3 g Intravenous $New Bag 7/20/25 0340)       Procedures   Procedures     Discussion of Management   See ED course    ED Course   ED Course as of 07/20/25 0430   Sun Jul 20, 2025   0124 I obtained history and examined the patient as noted above     0155 CRP Inflammation(!): 83.32   0155 WBC(!): 12.4   0227 Mononucleosis Screen: Negative   0331 Discussed patient with hospitalist Dr. Wheeler who accepted patient for admission       Additional Documentation  None    Medical Decision Making / Diagnosis     CMS Diagnoses: The patient has signs of sepsis   Sepsis ED evaluation   The patient has signs of sepsis as evidenced by:  1. Presence of 2 SIRS criteria, suspected infection, AND  2. Organ dysfunction: Sepsis work up in progress. Will continue to monitor for signs of organ dysfunction    Sepsis Care Initiation: Starting at 0318 AM on 07/20/25, until specified. Prior to this documentation, sepsis, severe sepsis, or septic shock was NOT thought to be a significant cause of illness. This order represents the first time infection was seriously considered to be affecting the patient.    Lactic Acid Results:  Recent Labs   Lab Test 07/20/25  0136   LACT 0.9       3 Hour Bundle 6 Hour Bundle (Reassessment)   Blood Cultures before IV Antibiotics: Yes  Antibiotics given: see below  Prehospital fluid volume (mL):                     Total fluids given (ED +Pre-hospital):  The patient responded to a lesser volume of IV fluids. The initial volume ordered was 1000 mL.    Repeat Lactic Acid Level: Ordered by reflex for 2 hours after initial lactic acid collection.  Vasopressors: MAP>65 after initial IVF bolus, will continue to monitor fluid status and vital signs.  Repeat perfusion exam: I attest to having performed a repeat sepsis exam and assessment of perfusion at 4:31 AM .   BMI Readings from Last  1 Encounters:   07/20/25 29.07 kg/m        Anti-infectives (From admission through now)      Start     Dose/Rate Route Frequency Ordered Stop    07/20/25 0325  ampicillin-sulbactam (UNASYN) 3 g vial to attach to  mL bag         3 g  over 15-30 Minutes Intravenous ONCE 07/20/25 0320 07/20/25 0355                [facility]   None               [facility]   Kj Nelson is a 59 year old male as described above presents to the emergency department for sore throat and left-sided neck swelling and neck pain.  Patient hemodynamically stable at time of evaluation.  Afebrile.  Mildly tachycardic.  Borderline temperature at 99.5  F.  Slight voice change per patient and family.  Initially, patient reports that he thought that he first noticed pain with neck turning and there was originally some concerns about neck vessel dissection based on original injury reviewed.  However, on further evaluation, patient endorses infectious type symptoms with fever, sore throat, with findings on examination demonstrates posterior oropharyngeal erythema suggestive of pharyngitis and pain is more reproducible with palpation of the neck and also with head turn to the left, but otherwise, patient denies any neck pain at rest.  At this point, we will prioritize CT soft tissue CT imaging soft tissue for evaluation for deep space neck infection/abscess.  No evidence of Brennan's angina at this time.  Will obtain infectious mononucleosis screen.  Viral swab and strep swab. Infectious workup.  Discussed care plan with patient and family who voiced understanding and agreement with plan.  Answered all questions.  Additional workup and orders as listed in chart.    Ultimately, work up shows deep space neck infection on CT imaging.  Patient was initiated on IV Unasyn and admitted to the hospitalist service for further evaluation and treatment.    Please refer to ED course above as part of continuation of MDM for details on the patient's treatment course and  any potential changes or updates beyond my initial evaluation and MDM creation.    Disposition   The patient was admitted to the hospital.     Diagnosis     ICD-10-CM    1. Neck infection  L08.9            Discharge Medications   New Prescriptions    No medications on file         Scribe Disclosure:  I, Kendell Gayle, am serving as a scribe at 1:14 AM on 7/20/2025 to document services personally performed by Triston Charles DO based on my observations and the provider's statements to me.        Triston Charles DO  07/20/25 0432

## 2025-07-20 NOTE — PLAN OF CARE
"Goal Outcome Evaluation:      Plan of Care Reviewed With: patient    Overall Patient Progress: improvingOverall Patient Progress: improving    Pt arrived from the ED around 0515. Oriented to the call light system. Wife at bedside    Outcome Evaluation: Pt is A&Ox4. VSS on RA. LS Clear. No SOB. Denies nausea. Bowel sounds normoactive. LBM: 7/19. NPO. UA collected. Independent. Ambulating. Voiding to the bathroom. IV abx. PIV R forearm running at 100mL/hr. Pain controlled with scheduled regimen. L sided neck pain. CMS: intact. Blood cultures pending.  Discharge plans TBD      Problem: Adult Inpatient Plan of Care  Goal: Plan of Care Review  Description: The Plan of Care Review/Shift note should be completed every shift.  The Outcome Evaluation is a brief statement about your assessment that the patient is improving, declining, or no change.  This information will be displayed automatically on your shift  note.  Outcome: Progressing  Flowsheets (Taken 7/20/2025 3386)  Outcome Evaluation: Pt is A&Ox4. VSS on RA. LS Clear. No SOB. Denies nausea. Bowel sounds normoactive. LBM: 7/19. NPO. UA collected. Independent. Ambulating. Voiding to the bathroom. IV abx. PIV R forearm running at 100mL/hr. Pain controlled with scheduled regimen. L sided neck pain. CMS: intact. Blood cultures pending.  Discharge plans TBD  Plan of Care Reviewed With: patient  Overall Patient Progress: improving  Goal: Patient-Specific Goal (Individualized)  Description: You can add care plan individualizations to a care plan. Examples of Individualization might be:  \"Parent requests to be called daily at 9am for status\", \"I have a hard time hearing out of my right ear\", or \"Do not touch me to wake me up as it startles  me\".  Outcome: Progressing  Goal: Absence of Hospital-Acquired Illness or Injury  Outcome: Progressing  Intervention: Identify and Manage Fall Risk  Recent Flowsheet Documentation  Taken 7/20/2025 4120 by Anirudh Don, RN  Safety " Promotion/Fall Prevention:   clutter free environment maintained   mobility aid in reach  Intervention: Prevent Skin Injury  Recent Flowsheet Documentation  Taken 7/20/2025 0520 by Anirudh Don RN  Body Position: position changed independently  Skin Protection: adhesive use limited  Intervention: Prevent and Manage VTE (Venous Thromboembolism) Risk  Recent Flowsheet Documentation  Taken 7/20/2025 0520 by Anirudh Don RN  VTE Prevention/Management: (ambulating) SCDs off (sequential compression devices)  Intervention: Prevent Infection  Recent Flowsheet Documentation  Taken 7/20/2025 0520 by Anirudh Don RN  Infection Prevention:   rest/sleep promoted   hand hygiene promoted  Goal: Optimal Comfort and Wellbeing  Outcome: Progressing  Intervention: Monitor Pain and Promote Comfort  Recent Flowsheet Documentation  Taken 7/20/2025 0520 by Anirudh Don RN  Pain Management Interventions: rest  Goal: Readiness for Transition of Care  Outcome: Progressing  Intervention: Mutually Develop Transition Plan  Recent Flowsheet Documentation  Taken 7/20/2025 0524 by Anirudh Don RN  Equipment Currently Used at Home: none

## 2025-07-20 NOTE — H&P
[facility]    History and Physical - Hospitalist Service       Date of Admission:  7/20/2025    Assessment & Plan      Kj Nelson is a 59 year old male admitted on 7/20/2025. He has PMH notable for anxiety, hypothyroidism that presents with left-sided neck pain and has findings concerning for unilateral deep space infection of the neck.  At the time of admission his findings are unilateral but he is at high risk to progress to Brennan's angina.    Med rec not done at the time of admission.  Please reorder patient's PTA medications once med rec has been completed.    #Left-sided deep space neck infection   The patient presents with subjective fevers, sore throat, left-sided neck swelling and neck pain/tenderness.  Denies difficulty breathing and difficulty swallowing.  At the time of presentation he is afebrile, hemodynamically stable.  Exam with left-sided neck swelling and tenderness with no crepitus.  WBC 12.4 and CRP 83 on admission.  Lactic acid 0.9.  CT neck soft tissue with abnormal soft tissue stranding and edema involving the left palatine tonsil and hypopharynx with extension into the submandibular space and carotid space where there is mild soft tissue stranding and lymphadenopathy. There is some compression of the internal jugular vein on the left without evidence of thrombus.  Overall findings are favored to represent infection. No evidence of abscess.  Unclear what triggered this deep space infection.  The patient denies recent history of tooth pain or odontogenic infection.  His findings are unilateral and therefore he is unlikely to have Brennan's angina.  There is no IJ thrombus/thrombophlebitis suggestive of Lemierre's syndrome  - N.p.o.  - ENT consult  - Continue Unasyn started in the ED  - Trend inflammatory markers  - The patient has no risk factors for MRSA.  Will obtain MRSA swab but hold vancomycin now since he has no risk factors  - Blood cultures pending  - Liquid  "Tylenol as needed for pain  - Start Lovenox if not undergoing a procedure later today given compression of left IJ    #Acute renal failure  -Likely prerenal, s/p 1 L IVF, continue MIVF  -UA, FeNA, urine sodium, bladder scan every shift  -Consider renal ultrasound pending clinical course    #Anxiety  - Continue citalopram    #Hypothyroidism  - Continue Synthroid        Diet:  NPO, s/p 1 L IVF in the ED, start MIVF with NS at 100 mL/hour for 2 days  DVT Prophylaxis: SCDs, have held Lovenox since he may need a procedure.  If he does not need a procedure he will need to start Lovenox given slight compression of left IJ.  Monteiro Catheter: Not present  Lines: None     Cardiac Monitoring: None  Code Status:  Full code, discussed with the patient on admission    Clinically Significant Risk Factors Present on Admission                             # Overweight: Estimated body mass index is 29.07 kg/m  as calculated from the following:    Height as of this encounter: 1.778 m (5' 10\").    Weight as of this encounter: 91.9 kg (202 lb 9.6 oz).              Disposition Plan     Medically Ready for Discharge: Anticipated in 2-4 Days           Jose Wheeler MD  Hospitalist Service  [facility]  Securely message with [employer] (more info)  Text page via [facility] Paging/Directory     ______________________________________________________________________    Chief Complaint   Left-sided neck swelling and pain    History is obtained from the patient    History of Present Illness   Kj Nelson is a 59 year old male who has PMH most notable for anxiety and hypothyroidism that presents with left-sided neck pain and swelling.    The patient developed left-sided neck pain 3 days ago.  The next morning the patient developed left-sided neck swelling  and subjective fevers and slight tenderness to his left neck.  He reports pain with turning his head to the left, sore throat and slight voice changes.  No difficulty " eating, drinking, breathing.  The swelling has progressed and thus prompted him to come to the ED tonight.  He denies any dental pain or recent odontogenic infection.  No headache, chest pain, dyspnea, abdominal pain, vomiting, diarrhea, urinary complaints.    The patient has never had a serious infection before.  No history of MRSA.  He works from home.  He does not use alcohol, tobacco, illicit substances.    ER course:  - Afebrile, HR 110s, RR 20, /40 and on RA  - K4.3, creatinine 1.85, glucose 119  - WBC 11.4, lactic acid 0.9  - Blood cultures obtained  - Oropharyngeal strep swab negative  - Monoscreen negative  - CT neck soft tissue with abnormal soft tissue stranding and edema involving the left palatine tonsil and hypopharynx with extension into the submandibular space  and carotid space where there is mild soft tissue stranding and lymphadenopathy. There is some compression of the internal jugular vein on the left without evidence of thrombus.  Overall findings are favored to represent infection. No evidence of abscess.   - Given 1 L IVF, Tylenol, Unasyn      Past Medical History    No past medical history on file.    Past Surgical History   No past surgical history on file.    Prior to Admission Medications   None        Review of Systems    The 10 point Review of Systems is negative other than noted in the HPI or here.     Social History   I have reviewed this patient's social history and updated it with pertinent information if needed.         Family History     No significant family history, including no history of: Immunodeficiencies      Allergies   Allergies   Allergen Reactions    Sulfabenzamide Rash    Sulfasalazine Rash        Physical Exam   Vital Signs: Temp: 99.5  F (37.5  C) Temp src: Temporal BP: (!) 161/110 Pulse: 119   Resp: 20 SpO2: 99 %      Weight: 202 lbs 9.64 oz    GENERAL: Lying in bed, no distress, appears stated age   HEENT: NC/AT, sclera anicteric, there is left-sided neck  swelling and tenderness that starts at the angle of the left mandible and extends under the left side of the patient's chin/submandibular space.  There is slight erythema about this area.  There is no crepitus about the left side, submandibular or right side of his face.  The right submandibular area and right mandible have no swelling.  Slight oropharyngeal erythema.  No no peritonsillar swelling.  CV: Mildly tachycardic but regular  PULM: CTAB, no wheezes, rales, rhonchi, normal work of breathing  GI: Abd soft, NT, ND  MSK: WWP, no LE edema   NEURO: Awake, alert, oriented to 7/20/2025, CN II-XII grossly intact, ALBA, appears nonfocal  SKIN: no rash     Medical Decision Making       70 MINUTES SPENT BY ME on the date of service doing chart review, history, exam, documentation & further activities per the note.      Data     I have personally reviewed the following data over the past 24 hrs:    12.4 (H)  \   16.1   / 364     135 100 18.6 /  119 (H)   4.3 21 (L) 1.85 (H) \     Procal: N/A CRP: 83.32 (H) Lactic Acid: 0.9         Imaging results reviewed over the past 24 hrs:   Recent Results (from the past 24 hours)   Soft tissue neck CT w contrast    Narrative    EXAM: CT SOFT TISSUE NECK W CONTRAST  LOCATION: [facility]  DATE: 07/20/2025    INDICATION: Sore throat, left-sided neck pain, fever, voice change.  COMPARISON: None.  CONTRAST: 90 mL Isovue 370.  TECHNIQUE: Routine CT Soft Tissue Neck with IV contrast. Multiplanar reformats. Dose reduction techniques were used.    FINDINGS: The oropharynx and hypopharynx are limited in evaluation due to streak artifact from the patient's dental hardware. There does appear to be abnormal enlargement and soft tissue swelling of the left greater than right palatine tonsils without   definite peritonsillar abscess. Edema extends into the right upper hypopharynx. Additional mild soft tissue stranding in the left submandibular and carotid spaces, presumably  related to the same process. There is stranding and mild adenopathy adjacent to   the left internal jugular vein which is flattened, though no evidence of jugular vein thrombus. Edema effaces the left piriform sinus and vallecula. Airway is widely patent.    SALIVARY GLANDS: Normal parotid and submandibular glands.    THYROID: Normal.     VISUALIZED INTRACRANIAL/ORBITS/SINUSES: No abnormality of the visualized intracranial compartment or orbits. Complete opacification of the right maxillary sinus. Paranasal sinuses are otherwise clear.    OTHER: No destructive osseous lesion. The included lung apices are clear.      Impression    IMPRESSION:   1.  Somewhat limited evaluation of the oral cavity, oropharynx, and upper hypopharynx due to dental artifact. There is abnormal soft tissue stranding and edema involving the left palatine tonsil and hypopharynx with extension into the submandibular space   and carotid space where there is mild soft tissue stranding and lymphadenopathy. There is some compression of the internal jugular vein on the left without evidence of thrombus. Findings are favored to represent infection. No evidence of abscess.    2.  Complete opacification of the right maxillary sinus.

## 2025-07-20 NOTE — ED NOTES
"[facility]  ED Nurse Handoff Report    ED Chief complaint: Neck Pain  . ED Diagnosis:   Final diagnoses:   Neck infection       Allergies:   Allergies   Allergen Reactions    Sulfabenzamide Rash    Sulfasalazine Rash       Code Status: Full Code    Activity level - Baseline/Home:  independent.  Activity Level - Current:   independent.   Lift room needed: No.   Bariatric: No   Needed: No   Isolation: No.   Infection: Not Applicable.     Respiratory status: Room air    Vital Signs (within 30 minutes):   Vitals:    07/20/25 0044   BP: (!) 161/110   Pulse: 119   Resp: 20   Temp: 99.5  F (37.5  C)   TempSrc: Temporal   SpO2: 99%   Weight: 91.9 kg (202 lb 9.6 oz)   Height: 1.778 m (5' 10\")       Cardiac Rhythm:  ,      Pain level:    Patient confused: No.   Patient Falls Risk: patient and family education.   Elimination Status: Has voided     Patient Report - Initial Complaint: . Here with wife for pain in left neck which started on Thursday after turning head sharply. Was seen at  at 1000 Saturday for pain and given toradol and flexeril, states toradol helped pain but has since worn off. Tylenol approx 1 hour PTA, flexeril 2130. States now neck is warm to touch, swollen and running low grade fever. Endorses sore throat which also started Thursday.   Focused Assessment: throat pain and swelling     Abnormal Results:   Labs Ordered and Resulted from Time of ED Arrival to Time of ED Departure   BASIC METABOLIC PANEL - Abnormal       Result Value    Sodium 135      Potassium 4.3      Chloride 100      Carbon Dioxide (CO2) 21 (*)     Anion Gap 14      Urea Nitrogen 18.6      Creatinine 1.85 (*)     GFR Estimate 42 (*)     Calcium 9.0      Glucose 119 (*)    CRP INFLAMMATION - Abnormal    CRP Inflammation 83.32 (*)    CBC WITH PLATELETS AND DIFFERENTIAL - Abnormal    WBC Count 12.4 (*)     RBC Count 5.32      Hemoglobin 16.1      Hematocrit 48.0      MCV 90      MCH 30.3      MCHC 33.5      " RDW 14.3      Platelet Count 364      % Neutrophils 62      % Lymphocytes 19      % Monocytes 14      % Eosinophils 4      % Basophils 0      % Immature Granulocytes 1      NRBCs per 100 WBC 0      Absolute Neutrophils 7.7      Absolute Lymphocytes 2.3      Absolute Monocytes 1.7 (*)     Absolute Eosinophils 0.6      Absolute Basophils 0.0      Absolute Immature Granulocytes 0.1      Absolute NRBCs 0.0     ISTAT GASES LACTATE VENOUS POCT - Abnormal    Lactic Acid POCT 0.9      Bicarbonate Venous POCT 24      O2 Sat, Venous POCT 36 (*)     pCO2 Venous POCT 39 (*)     pH Venous POCT 7.40      pO2 Venous POCT 22 (*)    INFLUENZA A/B, RSV AND SARS-COV2 PCR - Normal    Influenza A PCR Negative      Influenza B PCR Negative      RSV PCR Negative      SARS CoV2 PCR Negative     ERYTHROCYTE SEDIMENTATION RATE AUTO - Normal    Erythrocyte Sedimentation Rate 1     MONONUCLEOSIS SCREEN - Normal    Mononucleosis Screen Negative     GROUP A STREPTOCOCCUS PCR THROAT SWAB - Normal    Group A strep by PCR Not Detected     BLOOD CULTURE   BLOOD CULTURE   MRSA MSSA PCR, NASAL SWAB        Soft tissue neck CT w contrast   Final Result   IMPRESSION:    1.  Somewhat limited evaluation of the oral cavity, oropharynx, and upper hypopharynx due to dental artifact. There is abnormal soft tissue stranding and edema involving the left palatine tonsil and hypopharynx with extension into the submandibular space    and carotid space where there is mild soft tissue stranding and lymphadenopathy. There is some compression of the internal jugular vein on the left without evidence of thrombus. Findings are favored to represent infection. No evidence of abscess.      2.  Complete opacification of the right maxillary sinus.          Treatments provided: see MAR  Family Comments: wife at bedside  OBS brochure/video discussed/provided to patient:  Yes  ED Medications:   Medications   ampicillin-sulbactam (UNASYN) 3 g vial to attach to  mL bag (3 g  Intravenous $New Bag 7/20/25 0340)   sodium chloride 0.9% BOLUS 1,000 mL (0 mLs Intravenous Stopped 7/20/25 0340)   acetaminophen (TYLENOL) tablet 975 mg (975 mg Oral $Given 7/20/25 0136)   iopamidol (ISOVUE-370) solution 500 mL (90 mLs Intravenous $Given 7/20/25 0212)   sodium chloride 0.9 % bag for CT scan flush (65 mLs Intravenous $Given 7/20/25 0212)       Drips infusing:  No  For the majority of the shift this patient was Green.   Interventions performed were .    Sepsis treatment initiated: No    Cares/treatment/interventions/medications to be completed following ED care: all admit orders    ED Nurse Name: Maryana Crespo RN  3:43 AM     RECEIVING UNIT ED HANDOFF REVIEW    Above ED Nurse Handoff Report was reviewed: Yes  Reviewed by: Anirudh Torrez RN on July 20, 2025 at 4:59 AM   I Vocera called the ED to inform them the note was read: No, unable to reach with voicera

## 2025-07-20 NOTE — CONSULTS
[facility]  Otolaryngology Consultation         Rickie Padilla MD    Kj Nelson MRN# [id number]   YOB: 1965 Age: 59 year old      Date of Admission:  7/20/2025  Date of Consult: 7/20/2025         Assessment and Plan:   Left neck deep space cellulitis in setting of bacterial pharyngitis.  The exact cause of the pharyngitis is not clear but he did have onset of some raspiness of his voice and lower sounding voice and some neck pain early in the course of the infection.  Only finding today unusual was the 3 mm ulcer of the left posterior oral tongue which looks most consistent with an aphthous ulcer.  Unclear but unlikely that the the ulcer was the cause for the infection.  He is already responding to Unasyn.  He will continue Unasyn until tomorrow and if he continues to improve and is able to eat and drink and afebrile with trending white blood cell count down he can be discharged to home on oral Augmentin for 10 days.  Suggest he come back to ENT clinic in 2 weeks to recheck his tongue and the small ulcer.  If this ulcer is persistent I will perform a biopsy in the office.  Order for a regular diet placed in hospital.    CT neck showed incidental finding of opacification of the right maxillary sinus and it appears the medial maxillary wall is displaced laterally consistent with longstanding obstruction and some atelectasis of the sinus.  He denies being symptomatic of sinusitis.  When check him in 2 weeks we will discuss any further possible treatment for this finding.            Code Status:   Full Code         Primary Care Physician:   Artem Cordero [phone]           Chief Complaint:   Neck swelling and pain    History is obtained from the patient         History of Present Illness:   Kj Nelson is a 59 year old male who presented with neck swelling pain over the last several days.  He also had some laryngitis type symptoms with a lower sounding voice and  raspiness to his voice.  He interestingly did not have much in the way of sore throat or any dysphagia or odynophagia.  Because of the pain in the neck he sought treatment and got a CT scan which showed stranding and inflammation inferior and around the left tonsil and portions of the hypopharynx.  Consistent with deep space neck infection with cellulitis.  No sign of any abscess.  No history of any dental problems or dental infections.  He sees the dentist every 6 months.  He had testing for multiple viruses, group A strep, MRSA all of which is negative.  He is hungry at this point feeling better and feels the pain and swelling has improved since admission to the hospital and getting IV antibiotics.           Past Medical History:   No past medical history on file.            Past Surgical History:   No past surgical history on file.           Home Medications:     Prior to Admission medications    Medication Sig Last Dose Taking? Auth Provider Long Term End Date   citalopram (CELEXA) 20 MG tablet Take 20 mg by mouth daily. 7/19/2025 Evening Yes Unknown, Entered By History Yes    cyclobenzaprine (FLEXERIL) 10 MG tablet Take 5 mg by mouth 3 times daily as needed for muscle spasms. 7/19/2025 Evening Yes Unknown, Entered By History     levothyroxine (SYNTHROID/LEVOTHROID) 75 MCG tablet Take 75 mcg by mouth daily. 7/19/2025 Evening Yes Unknown, Entered By History Yes    LORazepam (ATIVAN) 0.5 MG tablet Take 0.5 mg by mouth daily as needed for anxiety. More than a month Yes Unknown, Entered By History              Current Medications:         Current Facility-Administered Medications   Medication Dose Route Frequency Provider Last Rate Last Admin    ampicillin-sulbactam (UNASYN) 3 g vial to attach to  mL bag  3 g Intravenous Q6H Jose Wheeler MD   3 g at 07/20/25 0933    sodium chloride (PF) 0.9% PF flush 3 mL  3 mL Intracatheter Q8H [facility] Jose Wheeler MD   3 mL at 07/20/25 0612     Current  "Facility-Administered Medications   Medication Dose Route Frequency Provider Last Rate Last Admin    acetaminophen (TYLENOL) oral liquid 650 mg  650 mg Oral Q4H PRN Jose Wheeler MD        calcium carbonate (TUMS) chewable tablet 1,000 mg  1,000 mg Oral 4x Daily PRN Jose Wheeler MD        lidocaine (LMX4) cream   Topical Q1H PRN Jose Wheeler MD        lidocaine 1 % 0.1-1 mL  0.1-1 mL Other Q1H PRN Jose Wheeler MD        ondansetron (ZOFRAN ODT) ODT tab 4 mg  4 mg Oral Q6H PRN Jose Wheeler MD        Or    ondansetron (ZOFRAN) injection 4 mg  4 mg Intravenous Q6H PRN Jose Wheeler MD        prochlorperazine (COMPAZINE) injection 10 mg  10 mg Intravenous Q6H PRN Jose Wheeler MD        Or    prochlorperazine (COMPAZINE) tablet 10 mg  10 mg Oral Q6H PRN Jose Wheeler MD        senna-docusate (SENOKOT-S/PERICOLACE) 8.6-50 MG per tablet 1 tablet  1 tablet Oral BID PRN Jose Wheeler MD        Or    senna-docusate (SENOKOT-S/PERICOLACE) 8.6-50 MG per tablet 2 tablet  2 tablet Oral BID PRN Jose Wheeler MD        sodium chloride (PF) 0.9% PF flush 3 mL  3 mL Intracatheter q1 min prn Jose Wheeler MD                Allergies:     Allergies   Allergen Reactions    Sulfabenzamide Rash    Sulfasalazine Rash            Social History:   Kj Nelson              Family History:   No family history on file.            Review of Systems:   The 10 point Review of Systems is negative other than noted in the HPI or here.             Physical Exam:    , Blood pressure (!) 143/92, pulse 96, temperature 99  F (37.2  C), temperature source Temporal, resp. rate 16, height 1.778 m (5' 10\"), weight 91.9 kg (202 lb 8 oz), SpO2 96%.  202 lbs 8 oz    PHYSICAL EXAMINATION    BP (!) 143/92 (BP Location: Left arm)   Pulse 96   Temp 99  F (37.2  C) (Temporal)   Resp 16   Ht 1.778 m (5' 10\")   Wt 91.9 kg (202 lb 8 oz)   SpO2 96%   BMI 29.06 kg/m  "   General appearance: Well developed, well nourished and groomed. No apparent acute or chronic distress.   Ability to communicate: normal. No hoarseness or stridor  HEAD, FACE, SALIVARY GLANDS AND TMJ:   Inspection of Head and Face: Normal contour and symmetry. No masses, lesions, or significant scars observed.   Palpation/Percussion of the Face: No tenderness, deformity or instability.   Palpation of Parotid and Submaxillary glands: Normal.   Facial Mobility: Normal.   EYES: Ocular Motility: gaze appears conjugate in all positions; no evident nystagmus.   EAR, NOSE, MOUTH AND THROAT:   Pinnas and External Nose: Normal.   Otoscopic exam: Normal canals and tympanic membranes, bilaterally.   Hearing: Conversational speech rarely or never misunderstands words.   Nasal Interior:   Septum - Midline.   Turbinates and middle meatus - Normal.   Rhinorrhea - None.   Vestibular skin - Normal bilaterally.   Mucosa - Normal.   Lips, Teeth and Gums: Normal. Adequate salivary pool.  Tongue with 3mm yellow ulcer most c/w aphthous ulcer located just anterior to the glossotonsillar sulcus,   Oral Cavity and Oropharynx: Normal, tonsils symmetric and nonobstructive  NECK AND THYROID:   Neck: mild swelling and tenderness left level II; trachea midline; normal laryngeal crepitation; no adenopathy; no neck masses; no skin lesions; no scars.   Thyroid: normal size; no masses or tenderness.   RESPIRATORY: Chest Wall expands normally and no deformities noted. Respiratory Effort normal.   CARDIOVASCULAR:   Symmetric carotid pulses regular rate.  Peripheral Vascular System: normal pulses with no peripheral vascular swelling or varicosities, tenderness or edema. Skin warm and dry.   LYMPH NODES: Neck Nodes: normal, no adenopathy.   NEUROLOGIC:   Level of orientation: normal to time, place, person and situation.   Cranial nerves: Cranial nerves II-XII grossly intact and symmetrical.   PSYCHIATRIC:   Level of consciousness: awake and alert.    Judgment and insight: normal.   Mood and affect: normal and appropriate to the situation.     Flexible Fiberoptic Laryngoscopy  DESCRIPTION OF PROCEDURE  ANESTHESIA: Topical Lidocaine and Phenylephrine  FINDINGS:  Nasal cavities: patent and normal.    Paid careful attention to the middle meatus on each side especially the right side since he has obstruction of the right maxillary sinus on the CT scan and there are no masses or any polyps.  No redness or signs of infection.  Nasopharynx examination: Normal   Pharyngeal walls on left mild edema, no narrowing of airway  Base of tongue, Vallecula and Pyriform Sinuses: Normal  Larynx: Normal, no edema, vocal cords symmetrically mobile  Subglottis and trachea: Normal  PROCEDURE: Risks and benefits were discussed and verbal consent provided by the patient. After vasoconstriction and topical anesthesia was established, the endoscope was introduced into the nasal airway and structures from the nares to the posterior choanae were evaluated. The findings are noted above.  TOLERANCE: Good  ESTIMATED BLOOD LOSS: nil         Data:   All new lab and imaging data was reviewed.   Recent Labs   Lab Test 07/20/25  0708 07/20/25  0132   WBC 11.7* 12.4*   HGB 15.4 16.1   MCV 90 90    364   INR 1.04  --       Recent Labs   Lab Test 07/20/25  0708 07/20/25  0132    135   POTASSIUM 4.1 4.3   CHLORIDE 103 100   CO2 20* 21*   BUN 16.3 18.6   CR 1.68* 1.85*   ANIONGAP 14 14   KEVIN 8.8 9.0   * 119*        Narrative & Impression   EXAM: CT SOFT TISSUE NECK W CONTRAST  LOCATION: [facility]  DATE: 07/20/2025     INDICATION: Sore throat, left-sided neck pain, fever, voice change.  COMPARISON: None.  CONTRAST: 90 mL Isovue 370.  TECHNIQUE: Routine CT Soft Tissue Neck with IV contrast. Multiplanar reformats. Dose reduction techniques were used.     FINDINGS: The oropharynx and hypopharynx are limited in evaluation due to streak artifact from the patient's  dental hardware. There does appear to be abnormal enlargement and soft tissue swelling of the left greater than right palatine tonsils without   definite peritonsillar abscess. Edema extends into the right upper hypopharynx. Additional mild soft tissue stranding in the left submandibular and carotid spaces, presumably related to the same process. There is stranding and mild adenopathy adjacent to   the left internal jugular vein which is flattened, though no evidence of jugular vein thrombus. Edema effaces the left piriform sinus and vallecula. Airway is widely patent.     SALIVARY GLANDS: Normal parotid and submandibular glands.     THYROID: Normal.      VISUALIZED INTRACRANIAL/ORBITS/SINUSES: No abnormality of the visualized intracranial compartment or orbits. Complete opacification of the right maxillary sinus. Paranasal sinuses are otherwise clear.     OTHER: No destructive osseous lesion. The included lung apices are clear.                                                                      IMPRESSION:   1.  Somewhat limited evaluation of the oral cavity, oropharynx, and upper hypopharynx due to dental artifact. There is abnormal soft tissue stranding and edema involving the left palatine tonsil and hypopharynx with extension into the submandibular space   and carotid space where there is mild soft tissue stranding and lymphadenopathy. There is some compression of the internal jugular vein on the left without evidence of thrombus. Findings are favored to represent infection. No evidence of abscess.     2.  Complete opacification of the right maxillary sinus.       Rickie Padilla M.D.  [facility]  Office [phone]  Pager [phone]

## 2025-07-21 LAB
ANION GAP SERPL CALCULATED.3IONS-SCNC: 12 MMOL/L (ref 7–15)
BASOPHILS # BLD AUTO: 0 10E3/UL (ref 0–0.2)
BASOPHILS NFR BLD AUTO: 0 %
BUN SERPL-MCNC: 10.1 MG/DL (ref 6–20)
CALCIUM SERPL-MCNC: 8.9 MG/DL (ref 8.8–10.4)
CHLORIDE SERPL-SCNC: 99 MMOL/L (ref 98–107)
CREAT SERPL-MCNC: 1.58 MG/DL (ref 0.67–1.17)
CRP SERPL-MCNC: 109.59 MG/L
EGFRCR SERPLBLD CKD-EPI 2021: 50 ML/MIN/1.73M2
EOSINOPHIL # BLD AUTO: 0.6 10E3/UL (ref 0–0.7)
EOSINOPHIL NFR BLD AUTO: 5 %
ERYTHROCYTE [DISTWIDTH] IN BLOOD BY AUTOMATED COUNT: 14.5 % (ref 10–15)
GLUCOSE SERPL-MCNC: 98 MG/DL (ref 70–99)
HCO3 SERPL-SCNC: 22 MMOL/L (ref 22–29)
HCT VFR BLD AUTO: 44.7 % (ref 40–53)
HGB BLD-MCNC: 15 G/DL (ref 13.3–17.7)
IMM GRANULOCYTES # BLD: 0.1 10E3/UL
IMM GRANULOCYTES NFR BLD: 0 %
LYMPHOCYTES # BLD AUTO: 3.1 10E3/UL (ref 0.8–5.3)
LYMPHOCYTES NFR BLD AUTO: 25 %
MCH RBC QN AUTO: 30.1 PG (ref 26.5–33)
MCHC RBC AUTO-ENTMCNC: 33.6 G/DL (ref 31.5–36.5)
MCV RBC AUTO: 90 FL (ref 78–100)
MONOCYTES # BLD AUTO: 1.8 10E3/UL (ref 0–1.3)
MONOCYTES NFR BLD AUTO: 15 %
NEUTROPHILS # BLD AUTO: 7 10E3/UL (ref 1.6–8.3)
NEUTROPHILS NFR BLD AUTO: 56 %
NRBC # BLD AUTO: 0 10E3/UL
NRBC BLD AUTO-RTO: 0 /100
PLAT MORPH BLD: NORMAL
PLATELET # BLD AUTO: 357 10E3/UL (ref 150–450)
POTASSIUM SERPL-SCNC: 4.1 MMOL/L (ref 3.4–5.3)
RBC # BLD AUTO: 4.98 10E6/UL (ref 4.4–5.9)
RBC MORPH BLD: NORMAL
SODIUM SERPL-SCNC: 133 MMOL/L (ref 135–145)
WBC # BLD AUTO: 12.7 10E3/UL (ref 4–11)

## 2025-07-21 PROCEDURE — 36415 COLL VENOUS BLD VENIPUNCTURE: CPT | Performed by: INTERNAL MEDICINE

## 2025-07-21 PROCEDURE — 250N000011 HC RX IP 250 OP 636: Performed by: INTERNAL MEDICINE

## 2025-07-21 PROCEDURE — 250N000013 HC RX MED GY IP 250 OP 250 PS 637: Performed by: INTERNAL MEDICINE

## 2025-07-21 PROCEDURE — 99239 HOSP IP/OBS DSCHRG MGMT >30: CPT | Performed by: INTERNAL MEDICINE

## 2025-07-21 PROCEDURE — 120N000001 HC R&B MED SURG/OB

## 2025-07-21 PROCEDURE — 80048 BASIC METABOLIC PNL TOTAL CA: CPT | Performed by: INTERNAL MEDICINE

## 2025-07-21 PROCEDURE — 86140 C-REACTIVE PROTEIN: CPT | Performed by: INTERNAL MEDICINE

## 2025-07-21 PROCEDURE — 85004 AUTOMATED DIFF WBC COUNT: CPT | Performed by: INTERNAL MEDICINE

## 2025-07-21 RX ORDER — LORAZEPAM 0.5 MG/1
0.5 TABLET ORAL DAILY PRN
Status: DISCONTINUED | OUTPATIENT
Start: 2025-07-21 | End: 2025-07-22 | Stop reason: HOSPADM

## 2025-07-21 RX ORDER — LEVOTHYROXINE SODIUM 75 UG/1
75 TABLET ORAL DAILY
Status: DISCONTINUED | OUTPATIENT
Start: 2025-07-21 | End: 2025-07-22 | Stop reason: HOSPADM

## 2025-07-21 RX ORDER — CITALOPRAM HYDROBROMIDE 20 MG/1
20 TABLET ORAL DAILY
Status: DISCONTINUED | OUTPATIENT
Start: 2025-07-21 | End: 2025-07-22 | Stop reason: HOSPADM

## 2025-07-21 RX ADMIN — CITALOPRAM HYDROBROMIDE 20 MG: 20 TABLET ORAL at 11:55

## 2025-07-21 RX ADMIN — AMPICILLIN SODIUM AND SULBACTAM SODIUM 3 G: 2; 1 INJECTION, POWDER, FOR SOLUTION INTRAMUSCULAR; INTRAVENOUS at 16:59

## 2025-07-21 RX ADMIN — ENOXAPARIN SODIUM 40 MG: 40 INJECTION SUBCUTANEOUS at 21:03

## 2025-07-21 RX ADMIN — LEVOTHYROXINE SODIUM 75 MCG: 0.07 TABLET ORAL at 11:55

## 2025-07-21 RX ADMIN — AMPICILLIN SODIUM AND SULBACTAM SODIUM 3 G: 2; 1 INJECTION, POWDER, FOR SOLUTION INTRAMUSCULAR; INTRAVENOUS at 04:31

## 2025-07-21 RX ADMIN — AMPICILLIN SODIUM AND SULBACTAM SODIUM 3 G: 2; 1 INJECTION, POWDER, FOR SOLUTION INTRAMUSCULAR; INTRAVENOUS at 09:51

## 2025-07-21 RX ADMIN — AMPICILLIN SODIUM AND SULBACTAM SODIUM 3 G: 2; 1 INJECTION, POWDER, FOR SOLUTION INTRAMUSCULAR; INTRAVENOUS at 21:03

## 2025-07-21 ASSESSMENT — ACTIVITIES OF DAILY LIVING (ADL)
ADLS_ACUITY_SCORE: 22

## 2025-07-21 NOTE — PLAN OF CARE
"Goal Outcome Evaluation:      Plan of Care Reviewed With: patient    Overall Patient Progress: improvingOverall Patient Progress: improving         A&O. RA. Indpt in room. Voiding. Denied pain, nausea and SOB. Wife at bedside.         Problem: Adult Inpatient Plan of Care  Goal: Plan of Care Review  Description: The Plan of Care Review/Shift note should be completed every shift.  The Outcome Evaluation is a brief statement about your assessment that the patient is improving, declining, or no change.  This information will be displayed automatically on your shift  note.  Outcome: Progressing  Flowsheets (Taken 7/21/2025 0123)  Plan of Care Reviewed With: patient  Overall Patient Progress: improving  Goal: Patient-Specific Goal (Individualized)  Description: You can add care plan individualizations to a care plan. Examples of Individualization might be:  \"Parent requests to be called daily at 9am for status\", \"I have a hard time hearing out of my right ear\", or \"Do not touch me to wake me up as it startles  me\".  Outcome: Progressing  Goal: Absence of Hospital-Acquired Illness or Injury  Outcome: Progressing  Intervention: Identify and Manage Fall Risk  Recent Flowsheet Documentation  Taken 7/21/2025 0005 by Courtney Hernandes, RN  Safety Promotion/Fall Prevention:   clutter free environment maintained   room near nurse's station   room organization consistent  Intervention: Prevent and Manage VTE (Venous Thromboembolism) Risk  Recent Flowsheet Documentation  Taken 7/21/2025 0005 by Courtney Hernandes, RN  VTE Prevention/Management: patient refused intervention  Intervention: Prevent Infection  Recent Flowsheet Documentation  Taken 7/21/2025 0005 by Courtney Hernandes, RN  Infection Prevention:   single patient room provided   rest/sleep promoted   hand hygiene promoted  Goal: Optimal Comfort and Wellbeing  Outcome: Progressing  Goal: Readiness for Transition of Care  Outcome: Progressing   "   Problem: Infection  Goal: Absence of Infection Signs and Symptoms  Outcome: Progressing     Problem: Pain Acute  Goal: Optimal Pain Control and Function  Outcome: Progressing  Intervention: Prevent or Manage Pain  Recent Flowsheet Documentation  Taken 7/21/2025 0005 by Courtney Hernandes, RN  Medication Review/Management: medications reviewed

## 2025-07-21 NOTE — PROGRESS NOTES
"ENT    He is feeling much improved and is eating well, almost no pain in the throat or neck.  On Unasyn did have a slight elevation of white blood cell counts and CRP    EXAM  /86 (BP Location: Left arm)   Pulse 96   Temp 98.3  F (36.8  C) (Temporal)   Resp 16   Ht 1.778 m (5' 10\")   Wt 91.9 kg (202 lb 8 oz)   SpO2 96%   BMI 29.06 kg/m    No stridor or stridor, voice normal  Neck is less swelling compared to yesterday nontender.  Oral cavity with slight swelling left oropharynx but improvement    Assessment and plan  Left pharyngitis and deep neck spaces cellulitis improvement..  Agree with plan to keep him for 1 more day of IV antibiotics.  I do think the current antibiotic is effective for him and will continue.  Will be discharged on oral antibiotics and follow-up in ENT clinic in 2 weeks.  May go home early tomorrow without ENT rounding.    Rickie Padilla M.D.  [facility]  Office [phone]  Pager [phone]    "

## 2025-07-21 NOTE — DISCHARGE SUMMARY
"[facility]  Hospitalist Discharge Summary      Date of Admission:  7/20/2025  Date of Discharge:  7/21/2025  Discharging Provider: Robert Pacheco MD, MD  Discharge Service: Hospitalist Service        Addendum:  Upon exam I noticed patient's left neck area still demonstrating asymmetry and still with ongoing swelling  Fortunately no ongoing worsening pain.  Able to swallow with no difficulty.  No stridor or rhonchi.  Not hypoxic  Still demonstrating stable hemodynamics.  Tolerating oral diet.  Voiding freely.  Had some loose stools earlier.  Denies any abdominal pain  Lab works showing upward trending of leukocytosis and increasing CRP markers  -I elected to postpone earlier planned discharge and continue IV antibiotics for now.  Patient and his wife was present at bedside agreeable to this plan of care.  -Repeat CBC and CRP in a.m.  -Continued on IV Unasyn      Discharge Diagnoses   Left neck deep space cellulitis with suspicion of underlying bacterial pharyngitis  History of elevated creatinine likely has underlying chronic kidney disease  History of anxiety disorder    Clinically Significant Risk Factors     # Overweight: Estimated body mass index is 29.06 kg/m  as calculated from the following:    Height as of this encounter: 1.778 m (5' 10\").    Weight as of this encounter: 91.9 kg (202 lb 8 oz).       Follow-ups Needed After Discharge   Follow-up Appointments       Follow Up      Follow up with ENT as scheduled        Hospital Follow-up with Existing Primary Care Provider (PCP)          Schedule Primary Care visit within: 7 Days               Unresulted Labs Ordered in the Past 30 Days of this Admission       Date and Time Order Name Status Description    7/21/2025  7:39 AM RBC and Platelet Morphology In process     7/20/2025  3:20 AM Blood Culture Peripheral blood (BC) Hand, Right Preliminary     7/20/2025  3:20 AM Blood Culture Peripheral blood (BC) Arm, Right Preliminary       "   These results will be followed up by primary care physician    Discharge Disposition   Discharged to home  Condition at discharge: Stable    Hospital Course   Kj is a pleasant 59-year-old gentleman likely has a background history of chronic kidney disease, anxiety disorder and was in his usual state of health until 1 day prior to presentation when he started having sensation neck discomfort, pain and eventually had some swelling that prompted him to seek medical attention again in the emergency room and eventually found with edema, no swelling concerning for inflammation and infectious process.  It was felt likely he has an underlying bacterial pharyngitis that led to this findings.  Fortunately he demonstrated no respiratory compromise.  No evidence of stridor, wheezing.  Nor hypoxia.  He remained afebrile.  Seen by ENT service no drainable abscess was seen.  Fortunately no significant reported events overnight.  Able to demonstrate tolerance to oral diet with no issues with swallowing.  No vomiting, diarrhea or mental status changes.  Remained afebrile.  Will continue with oral antibiotics of Augmentin to finish 10 days course as per ENT service recommendations.  Needs to closely follow-up with ENT as scheduled.        Kj Nelson is a 59 year old male admitted on 7/20/2025. He has PMH notable for anxiety, hypothyroidism that presents with left-sided neck pain and has findings concerning for unilateral deep space infection of the neck.  At the time of admission his findings are unilateral but he is at high risk to progress to Brennan's angina.        #Left-sided deep space neck infection     - Patient still needing inpatient care as he remained at risk for clinical deterioration will be benefited for close monitoring care  -Remain on broad-spectrum antibiotics currently on Unasyn  -Optimize pain control  -N.p.o. until seen by ENT service  -Continue to monitor and inflammatory and infectious  markers    #Chronic kidney disease  - Doubtful for any MYLES here  - I reviewed patient's care everywhere charting and has been had  findings of elevated creatinine at least in the last 15 years with no worsening levels.  He has been evaluated by his current providers and even nephrology service and no clear diagnosis regarding his elevated creatinine  - He is voiding freely and appears to be at baseline creatinine level  - I will no longer  pursue further investigation of this elevated creatinine here in the hospital but needs to closely follow-up with his outpatient providers    I will refer you to excerpts of my colleagues prior H&P as listed below for other details of his earlier presentation:    The patient presents with subjective fevers, sore throat, left-sided neck swelling and neck pain/tenderness.  Denies difficulty breathing and difficulty swallowing.  At the time of presentation he is afebrile, hemodynamically stable.  Exam with left-sided neck swelling and tenderness with no crepitus.  WBC 12.4 and CRP 83 on admission.  Lactic acid 0.9.  CT neck soft tissue with abnormal soft tissue stranding and edema involving the left palatine tonsil and hypopharynx with extension into the submandibular space and carotid space where there is mild soft tissue stranding and lymphadenopathy. There is some compression of the internal jugular vein on the left without evidence of thrombus.  Overall findings are favored to represent infection. No evidence of abscess.  Unclear what triggered this deep space infection.  The patient denies recent history of tooth pain or odontogenic infection.  His findings are unilateral and therefore he is unlikely to have Brennan's angina.  There is no IJ thrombus/thrombophlebitis suggestive of Lemierre's syndrome  - N.p.o.  - ENT consult  - Continue Unasyn started in the ED  - Trend inflammatory markers  - The patient has no risk factors for MRSA.  Will obtain MRSA swab but hold vancomycin  now since he has no risk factors  - Blood cultures pending  - Liquid Tylenol as needed for pain  - Start Lovenox if not undergoing a procedure later today given compression of left IJ        #Anxiety  - Continue citalopram    #Hypothyroidism  - Continue Synthroid    Consultations This Hospital Stay   ENT IP CONSULT    Code Status   Full Code    Time Spent on this Encounter   I, Robert Pacheco MD, MD, personally saw the patient today and spent greater than 30 minutes discharging this patient.       Robert Pacheco MD, MD  [facility]  [address] [place] [address]  Phone: [phone]  Fax: [phone]  ______________________________________________________________________    Physical Exam   Vital Signs: Temp: 99.2  F (37.3  C) Temp src: Temporal BP: (!) 143/87 Pulse: 93   Resp: 16 SpO2: 93 % O2 Device: None (Room air)    Weight: 202 lbs 8 oz  HEENT; Atraumatic, normocephalic, pinkish conjuctiva, pupils bilateral reactive   Skin: warm and moist, no rashes  Lymphatics: no cervical or axillary lymphandenopathy  Lungs: equal chest expansion, clear to auscultation, no wheezes, no stridor, no crackles,   Heart: normal rate, normal rhythm, no rubs or gallops.   Abdomen: normal bowel sounds, no tenderness, no peritoneal signs, no guarding  Extremities: no deformities, no edema   Neuro; follow commands, alert and oriented x3, spontaneous speech, coherent, moves all extremities spontaneously  Psych; no hallucination, euthymic mood, not agitated         Primary Care Physician   Artem Cordero    Discharge Orders      Reason for your hospital stay    Kj is a pleasant 59-year-old gentleman likely has a background history of chronic kidney disease, anxiety disorder and was in his usual state of health until 1 day prior to presentation when he started having sensation neck discomfort, pain and eventually had some swelling that prompted him to seek medical attention again  in the emergency room and eventually found with edema, no swelling concerning for inflammation and infectious process.  It was felt likely he has an underlying bacterial pharyngitis that led to this findings.  Fortunately he demonstrated no respiratory compromise.  No evidence of stridor, wheezing.  Nor hypoxia.  He remained afebrile.  Seen by ENT service no drainable abscess was seen.  Fortunately no significant reported events overnight.  Able to demonstrate tolerance to oral diet with no issues with swallowing.  No vomiting, diarrhea or mental status changes.  Remained afebrile.  Will continue with oral antibiotics of Augmentin to finish 10 days course as per ENT service recommendations.  Needs to closely follow-up with ENT as scheduled.     Activity    Your activity upon discharge: activity as tolerated     Follow Up    Follow up with ENT as scheduled     Full Code     Diet    Follow this diet upon discharge: Current Diet:Orders Placed This Encounter      Regular Diet Adult     Hospital Follow-up with Existing Primary Care Provider (PCP)            Significant Results and Procedures   Most Recent 3 CBC's:  Recent Labs   Lab Test 07/21/25  0728 07/20/25  0708 07/20/25  0132   WBC 12.7* 11.7* 12.4*   HGB 15.0 15.4 16.1   MCV 90 90 90    370 364     Most Recent 3 BMP's:  Recent Labs   Lab Test 07/21/25  0728 07/20/25  0708 07/20/25  0132   * 137 135   POTASSIUM 4.1 4.1 4.3   CHLORIDE 99 103 100   CO2 22 20* 21*   BUN 10.1 16.3 18.6   CR 1.58* 1.68* 1.85*   ANIONGAP 12 14 14   KEVIN 8.9 8.8 9.0   GLC 98 109* 119*     Most Recent 2 LFT's:  Recent Labs   Lab Test 12/10/21  1131   AST 22   ALT 20   ALKPHOS 68   BILITOTAL 0.3     Most Recent 3 INR's:  Recent Labs   Lab Test 07/20/25  0708   INR 1.04     7-Day Micro Results       Collected Updated Procedure Result Status      07/20/2025 0338 07/20/2025 1159 MRSA MSSA PCR, Nasal Swab [[id number]]    Swab from Nares, Bilateral    Final result Component Value    MRSA Target DNA Negative   SA Target DNA Negative            07/20/2025 0331 07/20/2025 2231 Blood Culture Peripheral blood (BC) Arm, Right [[id number]]   Peripheral blood (BC) from Arm, Right    Preliminary result Component Value   Culture No growth after 12 hours  [P]                07/20/2025 0331 07/20/2025 2231 Blood Culture Peripheral blood (BC) Hand, Right [[id number]]   Peripheral blood (BC) from Hand, Right    Preliminary result Component Value   Culture No growth after 12 hours  [P]                07/20/2025 0048 07/20/2025 0121 Group A Streptococcus PCR Throat Swab [[id number]]    Swab from Throat    Final result Component Value   Group A strep by PCR Not Detected            07/20/2025 0048 07/20/2025 0133 Influenza A/B, RSV and SARS-CoV2 PCR (COVID-19) Nose [[id number]]    Swab from Nose    Final result Component Value   Influenza A PCR Negative   Influenza B PCR Negative   RSV PCR Negative   SARS CoV2 PCR Negative   NEGATIVE: SARS-CoV-2 (COVID-19) RNA not detected, presumed negative.                  Most Recent TSH and T4:No lab results found.  Most Recent Hemoglobin A1c:No lab results found.  Most Recent 6 glucoses:  Recent Labs   Lab Test 07/21/25  0728 07/20/25  0708 07/20/25  0132 12/10/21  1131   GLC 98 109* 119* 97     Most Recent Urinalysis:  Recent Labs   Lab Test 07/20/25  0631   COLOR Straw   APPEARANCE Clear   URINEGLC Negative   URINEBILI Negative   URINEKETONE 10*   SG 1.030   UBLD Small*   URINEPH 5.0   PROTEIN Negative   NITRITE Negative   LEUKEST Negative   RBCU 1   WBCU 1   ,   Results for orders placed or performed during the hospital encounter of 07/20/25   Soft tissue neck CT w contrast    Narrative    EXAM: CT SOFT TISSUE NECK W CONTRAST  LOCATION: [facility]  DATE: 07/20/2025    INDICATION: Sore throat, left-sided neck pain, fever, voice change.  COMPARISON: None.  CONTRAST: 90 mL Isovue 370.  TECHNIQUE: Routine CT Soft Tissue Neck with IV  contrast. Multiplanar reformats. Dose reduction techniques were used.    FINDINGS: The oropharynx and hypopharynx are limited in evaluation due to streak artifact from the patient's dental hardware. There does appear to be abnormal enlargement and soft tissue swelling of the left greater than right palatine tonsils without   definite peritonsillar abscess. Edema extends into the right upper hypopharynx. Additional mild soft tissue stranding in the left submandibular and carotid spaces, presumably related to the same process. There is stranding and mild adenopathy adjacent to   the left internal jugular vein which is flattened, though no evidence of jugular vein thrombus. Edema effaces the left piriform sinus and vallecula. Airway is widely patent.    SALIVARY GLANDS: Normal parotid and submandibular glands.    THYROID: Normal.     VISUALIZED INTRACRANIAL/ORBITS/SINUSES: No abnormality of the visualized intracranial compartment or orbits. Complete opacification of the right maxillary sinus. Paranasal sinuses are otherwise clear.    OTHER: No destructive osseous lesion. The included lung apices are clear.      Impression    IMPRESSION:   1.  Somewhat limited evaluation of the oral cavity, oropharynx, and upper hypopharynx due to dental artifact. There is abnormal soft tissue stranding and edema involving the left palatine tonsil and hypopharynx with extension into the submandibular space   and carotid space where there is mild soft tissue stranding and lymphadenopathy. There is some compression of the internal jugular vein on the left without evidence of thrombus. Findings are favored to represent infection. No evidence of abscess.    2.  Complete opacification of the right maxillary sinus.       Discharge Medications      Review of your medicines        START taking        Dose / Directions   amoxicillin-clavulanate 875-125 MG tablet  Commonly known as: AUGMENTIN      Dose: 1 tablet  Take 1 tablet by mouth 2 times  daily for 10 days.  Quantity: 20 tablet  Refills: 0            CONTINUE these medicines which have NOT CHANGED        Dose / Directions   citalopram 20 MG tablet  Commonly known as: celeXA      Dose: 20 mg  Take 20 mg by mouth daily.  Refills: 0     cyclobenzaprine 10 MG tablet  Commonly known as: FLEXERIL      Dose: 5 mg  Take 5 mg by mouth 3 times daily as needed for muscle spasms.  Refills: 0     levothyroxine 75 MCG tablet  Commonly known as: SYNTHROID/LEVOTHROID      Dose: 75 mcg  Take 75 mcg by mouth daily.  Refills: 0     LORazepam 0.5 MG tablet  Commonly known as: ATIVAN      Dose: 0.5 mg  Take 0.5 mg by mouth daily as needed for anxiety.  Refills: 0               Where to get your medicines        These medications were sent to [facility] [address] [address] [address]      Phone: [phone]   amoxicillin-clavulanate 875-125 MG tablet       Allergies   Allergies   Allergen Reactions    Sulfabenzamide Rash    Sulfasalazine Rash

## 2025-07-21 NOTE — PLAN OF CARE
"Progress Note:    .60. Creatinine 1,58. WBC 12.7. Unasyn antibiotic. Hypertensive. Eats well, swallowing well. Has swelling on the left side of the exterior/lateral neck. Up to the bathroom. Significant other at the bedside. Pt is in agreement of care.  Plan is home tomorrow with oral antibiotics. \"I feel better with each hour.\"   Problem: Adult Inpatient Plan of Care  Goal: Plan of Care Review  Description: The Plan of Care Review/Shift note should be completed every shift.  The Outcome Evaluation is a brief statement about your assessment that the patient is improving, declining, or no change.  This information will be displayed automatically on your shift  note.  Outcome: Progressing  Flowsheets (Taken 7/21/2025 1116)  Plan of Care Reviewed With: patient  Overall Patient Progress: improving  Goal: Patient-Specific Goal (Individualized)  Description: You can add care plan individualizations to a care plan. Examples of Individualization might be:  \"Parent requests to be called daily at 9am for status\", \"I have a hard time hearing out of my right ear\", or \"Do not touch me to wake me up as it startles  me\".  Outcome: Progressing  Goal: Absence of Hospital-Acquired Illness or Injury  Outcome: Progressing  Intervention: Identify and Manage Fall Risk  Recent Flowsheet Documentation  Taken 7/21/2025 0950 by Maurice Ruiz  Safety Promotion/Fall Prevention:   clutter free environment maintained   room near nurse's station   room organization consistent  Intervention: Prevent Skin Injury  Recent Flowsheet Documentation  Taken 7/21/2025 0950 by Maurice Ruiz  Body Position: sitting up in bed  Intervention: Prevent and Manage VTE (Venous Thromboembolism) Risk  Recent Flowsheet Documentation  Taken 7/21/2025 0950 by Maurice Ruiz  VTE Prevention/Management: patient refused intervention  Intervention: Prevent Infection  Recent Flowsheet Documentation  Taken 7/21/2025 0950 by Maurice Ruiz  Infection " Prevention:   single patient room provided   rest/sleep promoted   hand hygiene promoted  Goal: Optimal Comfort and Wellbeing  Outcome: Progressing  Goal: Readiness for Transition of Care  Outcome: Progressing     Problem: Infection  Goal: Absence of Infection Signs and Symptoms  Outcome: Progressing  Intervention: Prevent or Manage Infection  Recent Flowsheet Documentation  Taken 7/21/2025 0950 by Maurice Ruiz  Infection Management: aseptic technique maintained     Problem: Pain Acute  Goal: Optimal Pain Control and Function  Outcome: Progressing  Intervention: Prevent or Manage Pain  Recent Flowsheet Documentation  Taken 7/21/2025 0950 by Maurice Ruiz  Medication Review/Management: medications reviewed   Goal Outcome Evaluation:      Plan of Care Reviewed With: patient    Overall Patient Progress: improvingOverall Patient Progress: improving

## 2025-07-22 VITALS
TEMPERATURE: 97.7 F | BODY MASS INDEX: 28.99 KG/M2 | OXYGEN SATURATION: 95 % | SYSTOLIC BLOOD PRESSURE: 147 MMHG | WEIGHT: 202.5 LBS | HEART RATE: 90 BPM | HEIGHT: 70 IN | RESPIRATION RATE: 22 BRPM | DIASTOLIC BLOOD PRESSURE: 96 MMHG

## 2025-07-22 LAB
CRP SERPL-MCNC: 103.3 MG/L
MCV RBC AUTO: 90 FL (ref 78–100)
WBC # BLD AUTO: 9.3 10E3/UL (ref 4–11)

## 2025-07-22 PROCEDURE — 86140 C-REACTIVE PROTEIN: CPT | Performed by: INTERNAL MEDICINE

## 2025-07-22 PROCEDURE — 250N000011 HC RX IP 250 OP 636: Performed by: INTERNAL MEDICINE

## 2025-07-22 PROCEDURE — 36415 COLL VENOUS BLD VENIPUNCTURE: CPT | Performed by: INTERNAL MEDICINE

## 2025-07-22 PROCEDURE — 85048 AUTOMATED LEUKOCYTE COUNT: CPT | Performed by: INTERNAL MEDICINE

## 2025-07-22 PROCEDURE — 250N000013 HC RX MED GY IP 250 OP 250 PS 637: Performed by: INTERNAL MEDICINE

## 2025-07-22 RX ADMIN — AMPICILLIN SODIUM AND SULBACTAM SODIUM 3 G: 2; 1 INJECTION, POWDER, FOR SOLUTION INTRAMUSCULAR; INTRAVENOUS at 09:21

## 2025-07-22 RX ADMIN — CITALOPRAM HYDROBROMIDE 20 MG: 20 TABLET ORAL at 09:21

## 2025-07-22 RX ADMIN — LEVOTHYROXINE SODIUM 75 MCG: 0.07 TABLET ORAL at 11:28

## 2025-07-22 RX ADMIN — AMPICILLIN SODIUM AND SULBACTAM SODIUM 3 G: 2; 1 INJECTION, POWDER, FOR SOLUTION INTRAMUSCULAR; INTRAVENOUS at 04:14

## 2025-07-22 ASSESSMENT — ACTIVITIES OF DAILY LIVING (ADL)
ADLS_ACUITY_SCORE: 22

## 2025-07-22 NOTE — DISCHARGE SUMMARY
Patient's pain and swelling in the left side of the throat is much less today  He is able to swallow without any difficulty or pain  He wants to go home  WBC is decreased and so his CRP  I did tell him to take antibiotics and to watch for diarrhea on antibiotic treatment  Please refer to the discharge summary dictated by Dr. Rishi Piña is dated 7/21/2025 for complete details

## 2025-07-22 NOTE — PLAN OF CARE
"  Problem: Adult Inpatient Plan of Care  Goal: Plan of Care Review  Description: The Plan of Care Review/Shift note should be completed every shift.  The Outcome Evaluation is a brief statement about your assessment that the patient is improving, declining, or no change.  This information will be displayed automatically on your shift  note.  Outcome: Progressing  Flowsheets (Taken 7/22/2025 0610)  Outcome Evaluation: Possible discharge today. C/o itching on neck, Lotion provided.  Plan of Care Reviewed With: patient  Overall Patient Progress: improving  Goal: Patient-Specific Goal (Individualized)  Description: You can add care plan individualizations to a care plan. Examples of Individualization might be:  \"Parent requests to be called daily at 9am for status\", \"I have a hard time hearing out of my right ear\", or \"Do not touch me to wake me up as it startles  me\".  Outcome: Progressing  Goal: Absence of Hospital-Acquired Illness or Injury  Outcome: Progressing  Intervention: Identify and Manage Fall Risk  Recent Flowsheet Documentation  Taken 7/21/2025 1927 by Erendira Acosta, RN  Safety Promotion/Fall Prevention:   clutter free environment maintained   room near nurse's station   room organization consistent  Intervention: Prevent Infection  Recent Flowsheet Documentation  Taken 7/21/2025 1927 by Erendira Acosta, RN  Infection Prevention:   single patient room provided   rest/sleep promoted   hand hygiene promoted  Goal: Optimal Comfort and Wellbeing  Outcome: Progressing  Goal: Readiness for Transition of Care  Outcome: Progressing     Problem: Infection  Goal: Absence of Infection Signs and Symptoms  Outcome: Progressing     Problem: Pain Acute  Goal: Optimal Pain Control and Function  Outcome: Progressing  Intervention: Prevent or Manage Pain  Recent Flowsheet Documentation  Taken 7/21/2025 1927 by Erendira Acosta, RN  Medication Review/Management: medications reviewed   Goal Outcome Evaluation:      Plan of Care " Reviewed With: patient    Overall Patient Progress: improvingOverall Patient Progress: improving    Outcome Evaluation: Possible discharge today. C/o itching on neck, Lotion provided.

## 2025-07-22 NOTE — PLAN OF CARE
"Progress Note:    .3.  WBC 9.3. Unasyn antibiotic. Hypertensive. Eats well, swallowing well. Has swelling on the left side of the exterior/lateral neck, improving. Up to the bathroom. Significant other at the bedside. Pt is in agreement of care.   1145 discharge to home with Augmentin oral antibiotic. Verbalized understanding to all discharge instructions. In agreement of discharge plan to home.   Independent with all ADLs and \"feeling way better.\"    Problem: Adult Inpatient Plan of Care  Goal: Plan of Care Review  Description: The Plan of Care Review/Shift note should be completed every shift.  The Outcome Evaluation is a brief statement about your assessment that the patient is improving, declining, or no change.  This information will be displayed automatically on your shift  note.  Outcome: Progressing  Flowsheets (Taken 7/22/2025 1109)  Plan of Care Reviewed With:   patient   spouse  Goal: Patient-Specific Goal (Individualized)  Description: You can add care plan individualizations to a care plan. Examples of Individualization might be:  \"Parent requests to be called daily at 9am for status\", \"I have a hard time hearing out of my right ear\", or \"Do not touch me to wake me up as it startles  me\".  Outcome: Progressing  Goal: Absence of Hospital-Acquired Illness or Injury  Outcome: Progressing  Intervention: Identify and Manage Fall Risk  Recent Flowsheet Documentation  Taken 7/22/2025 1010 by Maurice Ruiz  Safety Promotion/Fall Prevention:   clutter free environment maintained   room near nurse's station   room organization consistent  Intervention: Prevent Skin Injury  Recent Flowsheet Documentation  Taken 7/22/2025 1010 by Maurice Ruiz  Body Position: sitting up in bed  Skin Protection: adhesive use limited  Intervention: Prevent and Manage VTE (Venous Thromboembolism) Risk  Recent Flowsheet Documentation  Taken 7/22/2025 1010 by Maurice Ruiz  VTE Prevention/Management: patient refused " intervention  Intervention: Prevent Infection  Recent Flowsheet Documentation  Taken 7/22/2025 1010 by Maurice Ruiz  Infection Prevention:   single patient room provided   rest/sleep promoted   hand hygiene promoted  Goal: Optimal Comfort and Wellbeing  Outcome: Progressing  Goal: Readiness for Transition of Care  Outcome: Progressing     Problem: Infection  Goal: Absence of Infection Signs and Symptoms  Outcome: Progressing  Intervention: Prevent or Manage Infection  Recent Flowsheet Documentation  Taken 7/22/2025 1010 by Maurice Ruiz  Infection Management: aseptic technique maintained     Problem: Pain Acute  Goal: Optimal Pain Control and Function  Outcome: Progressing  Intervention: Prevent or Manage Pain  Recent Flowsheet Documentation  Taken 7/22/2025 1010 by Maurice Ruiz  Bowel Elimination Promotion:   adequate fluid intake promoted   ambulation promoted   privacy promoted  Medication Review/Management: medications reviewed   Goal Outcome Evaluation:      Plan of Care Reviewed With: patient, spouse    Overall Patient Progress: improvingOverall Patient Progress: improving

## 2025-07-24 LAB
BACTERIA SPEC CULT: NORMAL
BACTERIA SPEC CULT: NORMAL

## 2025-07-25 LAB
BACTERIA SPEC CULT: NO GROWTH
BACTERIA SPEC CULT: NO GROWTH